# Patient Record
Sex: FEMALE | Race: OTHER | Employment: FULL TIME | URBAN - NONMETROPOLITAN AREA
[De-identification: names, ages, dates, MRNs, and addresses within clinical notes are randomized per-mention and may not be internally consistent; named-entity substitution may affect disease eponyms.]

---

## 2017-02-03 ENCOUNTER — HOSPITAL ENCOUNTER (EMERGENCY)
Dept: EMERGENCY DEPT | Age: 47
Discharge: OTHER INSTITUTION | End: 2017-02-04
Attending: EMERGENCY MEDICINE
Payer: MEDICAID

## 2017-02-03 VITALS
TEMPERATURE: 98.9 F | RESPIRATION RATE: 18 BRPM | DIASTOLIC BLOOD PRESSURE: 84 MMHG | OXYGEN SATURATION: 98 % | SYSTOLIC BLOOD PRESSURE: 134 MMHG | HEART RATE: 75 BPM

## 2017-02-03 DIAGNOSIS — R55 SYNCOPE AND COLLAPSE: ICD-10-CM

## 2017-02-03 DIAGNOSIS — M54.5 LOW BACK PAIN, UNSPECIFIED BACK PAIN LATERALITY, UNSPECIFIED CHRONICITY, WITH SCIATICA PRESENCE UNSPECIFIED: ICD-10-CM

## 2017-02-03 DIAGNOSIS — R07.9 CHEST PAIN, UNSPECIFIED TYPE: Primary | ICD-10-CM

## 2017-02-03 DIAGNOSIS — M54.2 NECK PAIN: ICD-10-CM

## 2017-02-03 LAB
-: ABNORMAL
ABSOLUTE EOS #: 0.1 K/UL (ref 0–0.4)
ABSOLUTE LYMPH #: 2.2 K/UL (ref 1–4.8)
ABSOLUTE MONO #: 0.3 K/UL (ref 0.1–1.2)
AMORPHOUS: ABNORMAL
ANION GAP SERPL CALCULATED.3IONS-SCNC: 15 MMOL/L (ref 9–17)
BACTERIA: ABNORMAL
BASOPHILS # BLD: 1 % (ref 0–2)
BASOPHILS ABSOLUTE: 0.1 K/UL (ref 0–0.2)
BILIRUBIN URINE: NEGATIVE
BUN BLDV-MCNC: 11 MG/DL (ref 6–20)
BUN/CREAT BLD: 17 (ref 9–20)
CALCIUM SERPL-MCNC: 8.8 MG/DL (ref 8.6–10.4)
CASTS UA: ABNORMAL /LPF (ref 0–2)
CHLORIDE BLD-SCNC: 102 MMOL/L (ref 98–107)
CO2: 23 MMOL/L (ref 20–31)
COLOR: ABNORMAL
COMMENT UA: ABNORMAL
CREAT SERPL-MCNC: 0.64 MG/DL (ref 0.5–0.9)
CRYSTALS, UA: ABNORMAL /HPF
D DIMER: 202 NG/ML
DIFFERENTIAL TYPE: ABNORMAL
EOSINOPHILS RELATIVE PERCENT: 1 % (ref 1–4)
EPITHELIAL CELLS UA: ABNORMAL /HPF (ref 0–5)
GFR AFRICAN AMERICAN: >60 ML/MIN
GFR NON-AFRICAN AMERICAN: >60 ML/MIN
GFR SERPL CREATININE-BSD FRML MDRD: NORMAL ML/MIN/{1.73_M2}
GFR SERPL CREATININE-BSD FRML MDRD: NORMAL ML/MIN/{1.73_M2}
GLUCOSE BLD-MCNC: 90 MG/DL (ref 70–99)
GLUCOSE URINE: NEGATIVE
HCG(URINE) PREGNANCY TEST: NEGATIVE
HCT VFR BLD CALC: 47.5 % (ref 36–46)
HEMOGLOBIN: 15.5 G/DL (ref 12–16)
KETONES, URINE: ABNORMAL
LEUKOCYTE ESTERASE, URINE: NEGATIVE
LYMPHOCYTES # BLD: 26 % (ref 24–44)
MCH RBC QN AUTO: 27.5 PG (ref 26–34)
MCHC RBC AUTO-ENTMCNC: 32.7 G/DL (ref 31–37)
MCV RBC AUTO: 84.2 FL (ref 80–100)
MONOCYTES # BLD: 4 % (ref 1–7)
MUCUS: ABNORMAL
NITRITE, URINE: NEGATIVE
OTHER OBSERVATIONS UA: ABNORMAL
PDW BLD-RTO: 14.2 % (ref 11–14.5)
PH UA: 6 (ref 5–6)
PLATELET # BLD: 360 K/UL (ref 140–450)
PLATELET ESTIMATE: ABNORMAL
PMV BLD AUTO: 8.2 FL (ref 6–12)
POTASSIUM SERPL-SCNC: 4 MMOL/L (ref 3.7–5.3)
PROTEIN UA: NEGATIVE
RBC # BLD: 5.64 M/UL (ref 4–5.2)
RBC # BLD: ABNORMAL 10*6/UL
RBC UA: ABNORMAL /HPF (ref 0–4)
RENAL EPITHELIAL, UA: ABNORMAL /HPF
SEG NEUTROPHILS: 68 % (ref 36–66)
SEGMENTED NEUTROPHILS ABSOLUTE COUNT: 5.7 K/UL (ref 1.8–7.7)
SODIUM BLD-SCNC: 140 MMOL/L (ref 135–144)
SPECIFIC GRAVITY UA: 1.02 (ref 1.01–1.02)
TRICHOMONAS: ABNORMAL
TROPONIN INTERP: NORMAL
TROPONIN T: <0.03 NG/ML
TURBIDITY: ABNORMAL
URINE HGB: NEGATIVE
UROBILINOGEN, URINE: NORMAL
WBC # BLD: 8.4 K/UL (ref 3.5–11)
WBC # BLD: ABNORMAL 10*3/UL
WBC UA: ABNORMAL /HPF (ref 0–4)
YEAST: ABNORMAL

## 2017-02-03 PROCEDURE — 85025 COMPLETE CBC W/AUTO DIFF WBC: CPT

## 2017-02-03 PROCEDURE — 72120 X-RAY BEND ONLY L-S SPINE: CPT

## 2017-02-03 PROCEDURE — 72125 CT NECK SPINE W/O DYE: CPT

## 2017-02-03 PROCEDURE — 70450 CT HEAD/BRAIN W/O DYE: CPT

## 2017-02-03 PROCEDURE — 84703 CHORIONIC GONADOTROPIN ASSAY: CPT

## 2017-02-03 PROCEDURE — 81001 URINALYSIS AUTO W/SCOPE: CPT

## 2017-02-03 PROCEDURE — 36415 COLL VENOUS BLD VENIPUNCTURE: CPT

## 2017-02-03 PROCEDURE — 93005 ELECTROCARDIOGRAM TRACING: CPT

## 2017-02-03 PROCEDURE — 72100 X-RAY EXAM L-S SPINE 2/3 VWS: CPT

## 2017-02-03 PROCEDURE — 84484 ASSAY OF TROPONIN QUANT: CPT

## 2017-02-03 PROCEDURE — 9990 CHARGE CONVERSION

## 2017-02-03 PROCEDURE — 99285 EMERGENCY DEPT VISIT HI MDM: CPT

## 2017-02-03 PROCEDURE — 71020 CHARGE CONVERSION: CPT

## 2017-02-03 PROCEDURE — 99282 EMERGENCY DEPT VISIT SF MDM: CPT

## 2017-02-03 PROCEDURE — 85379 FIBRIN DEGRADATION QUANT: CPT

## 2017-02-03 PROCEDURE — 80048 BASIC METABOLIC PNL TOTAL CA: CPT

## 2017-02-03 RX ORDER — 0.9 % SODIUM CHLORIDE 0.9 %
1000 INTRAVENOUS SOLUTION INTRAVENOUS ONCE
Status: COMPLETED | OUTPATIENT
Start: 2017-02-03 | End: 2017-02-03

## 2017-02-03 RX ORDER — NITROGLYCERIN 0.4 MG/1
0.4 TABLET SUBLINGUAL EVERY 5 MIN PRN
Status: DISCONTINUED | OUTPATIENT
Start: 2017-02-03 | End: 2017-02-04 | Stop reason: HOSPADM

## 2017-02-03 RX ORDER — AMLODIPINE BESYLATE 10 MG/1
10 TABLET ORAL DAILY
Status: ON HOLD | COMMUNITY
End: 2017-02-05 | Stop reason: HOSPADM

## 2017-02-03 RX ORDER — ACETAMINOPHEN 500 MG
1000 TABLET ORAL ONCE
Status: COMPLETED | OUTPATIENT
Start: 2017-02-03 | End: 2017-02-03

## 2017-02-03 RX ADMIN — Medication 1000 ML: at 21:52

## 2017-02-03 RX ADMIN — NITROGLYCERIN 0.4 MG: 0.4 TABLET SUBLINGUAL at 20:47

## 2017-02-03 RX ADMIN — Medication 1000 MG: at 21:51

## 2017-02-03 ASSESSMENT — ENCOUNTER SYMPTOMS
NAUSEA: 0
VOMITING: 0
SHORTNESS OF BREATH: 0

## 2017-02-03 ASSESSMENT — PAIN DESCRIPTION - DESCRIPTORS: DESCRIPTORS: SHARP

## 2017-02-03 ASSESSMENT — PAIN SCALES - GENERAL
PAINLEVEL_OUTOF10: 8
PAINLEVEL_OUTOF10: 2
PAINLEVEL_OUTOF10: 9

## 2017-02-03 ASSESSMENT — PAIN DESCRIPTION - FREQUENCY: FREQUENCY: CONTINUOUS

## 2017-02-03 ASSESSMENT — PAIN DESCRIPTION - LOCATION: LOCATION: CHEST

## 2017-02-03 ASSESSMENT — PAIN DESCRIPTION - PAIN TYPE: TYPE: ACUTE PAIN

## 2017-02-04 ENCOUNTER — HOSPITAL ENCOUNTER (INPATIENT)
Age: 47
LOS: 1 days | Discharge: HOME | DRG: 204 | End: 2017-02-05
Attending: FAMILY MEDICINE | Admitting: FAMILY MEDICINE
Payer: MEDICAID

## 2017-02-04 PROBLEM — I10 ESSENTIAL HYPERTENSION: Status: ACTIVE | Noted: 2017-02-04

## 2017-02-04 PROBLEM — R00.2 PALPITATION: Status: ACTIVE | Noted: 2017-02-04

## 2017-02-04 PROBLEM — R07.89 ANTERIOR CHEST WALL PAIN: Status: ACTIVE | Noted: 2017-02-04

## 2017-02-04 PROBLEM — R55 SYNCOPE AND COLLAPSE: Status: ACTIVE | Noted: 2017-02-04

## 2017-02-04 LAB
ANION GAP SERPL CALCULATED.3IONS-SCNC: 16 MMOL/L (ref 9–17)
BUN BLDV-MCNC: 8 MG/DL (ref 6–20)
BUN/CREAT BLD: ABNORMAL (ref 9–20)
CALCIUM SERPL-MCNC: 8.2 MG/DL (ref 8.6–10.4)
CHLORIDE BLD-SCNC: 106 MMOL/L (ref 98–107)
CHOLESTEROL/HDL RATIO: 4.2
CHOLESTEROL: 146 MG/DL
CO2: 16 MMOL/L (ref 20–31)
CREAT SERPL-MCNC: 0.49 MG/DL (ref 0.5–0.9)
GFR AFRICAN AMERICAN: >60 ML/MIN
GFR NON-AFRICAN AMERICAN: >60 ML/MIN
GFR SERPL CREATININE-BSD FRML MDRD: ABNORMAL ML/MIN/{1.73_M2}
GFR SERPL CREATININE-BSD FRML MDRD: ABNORMAL ML/MIN/{1.73_M2}
GLUCOSE BLD-MCNC: 89 MG/DL (ref 70–99)
HCT VFR BLD CALC: 41.9 % (ref 36–46)
HDLC SERPL-MCNC: 35 MG/DL
HEMOGLOBIN: 14.1 G/DL (ref 12–16)
LDL CHOLESTEROL: 94 MG/DL (ref 0–130)
LV EF: 55 %
LVEF MODALITY: NORMAL
MCH RBC QN AUTO: 27.7 PG (ref 26–34)
MCHC RBC AUTO-ENTMCNC: 33.6 G/DL (ref 31–37)
MCV RBC AUTO: 82.7 FL (ref 80–100)
MYOGLOBIN: 23 NG/ML (ref 25–58)
MYOGLOBIN: <21 NG/ML (ref 25–58)
PDW BLD-RTO: 14.5 % (ref 12.5–15.4)
PLATELET # BLD: 330 K/UL (ref 140–450)
PMV BLD AUTO: 8.8 FL (ref 6–12)
POTASSIUM SERPL-SCNC: 3.8 MMOL/L (ref 3.7–5.3)
RBC # BLD: 5.07 M/UL (ref 4–5.2)
SODIUM BLD-SCNC: 138 MMOL/L (ref 135–144)
TRIGL SERPL-MCNC: 83 MG/DL
TROPONIN INTERP: ABNORMAL
TROPONIN INTERP: ABNORMAL
TROPONIN INTERP: NORMAL
TROPONIN T: <0.03 NG/ML
TSH SERPL DL<=0.05 MIU/L-ACNC: 2.41 MIU/L (ref 0.3–5)
VLDLC SERPL CALC-MCNC: ABNORMAL MG/DL (ref 1–30)
WBC # BLD: 7.4 K/UL (ref 3.5–11)

## 2017-02-04 PROCEDURE — 99223 1ST HOSP IP/OBS HIGH 75: CPT | Performed by: FAMILY MEDICINE

## 2017-02-04 PROCEDURE — 80048 BASIC METABOLIC PNL TOTAL CA: CPT

## 2017-02-04 PROCEDURE — 83874 ASSAY OF MYOGLOBIN: CPT

## 2017-02-04 PROCEDURE — 84484 ASSAY OF TROPONIN QUANT: CPT

## 2017-02-04 PROCEDURE — 85027 COMPLETE CBC AUTOMATED: CPT

## 2017-02-04 PROCEDURE — 93005 ELECTROCARDIOGRAM TRACING: CPT

## 2017-02-04 PROCEDURE — 93306 TTE W/DOPPLER COMPLETE: CPT | Performed by: FAMILY MEDICINE

## 2017-02-04 PROCEDURE — 84443 ASSAY THYROID STIM HORMONE: CPT

## 2017-02-04 PROCEDURE — 36415 COLL VENOUS BLD VENIPUNCTURE: CPT

## 2017-02-04 PROCEDURE — 80061 LIPID PANEL: CPT

## 2017-02-04 PROCEDURE — 2140000000 HC CCU INTERMEDIATE R&B

## 2017-02-04 PROCEDURE — 6370000000 HC RX 637 (ALT 250 FOR IP): Performed by: FAMILY MEDICINE

## 2017-02-04 RX ORDER — MORPHINE SULFATE 2 MG/ML
1 INJECTION, SOLUTION INTRAMUSCULAR; INTRAVENOUS
Status: DISCONTINUED | OUTPATIENT
Start: 2017-02-04 | End: 2017-02-05 | Stop reason: HOSPADM

## 2017-02-04 RX ORDER — SODIUM CHLORIDE 9 MG/ML
INJECTION, SOLUTION INTRAVENOUS CONTINUOUS
Status: DISCONTINUED | OUTPATIENT
Start: 2017-02-04 | End: 2017-02-05

## 2017-02-04 RX ORDER — ACETAMINOPHEN 325 MG/1
650 TABLET ORAL EVERY 4 HOURS PRN
Status: DISCONTINUED | OUTPATIENT
Start: 2017-02-04 | End: 2017-02-05 | Stop reason: HOSPADM

## 2017-02-04 RX ORDER — POTASSIUM CHLORIDE 20 MEQ/1
40 TABLET, EXTENDED RELEASE ORAL PRN
Status: DISCONTINUED | OUTPATIENT
Start: 2017-02-04 | End: 2017-02-05 | Stop reason: HOSPADM

## 2017-02-04 RX ORDER — ASPIRIN 81 MG/1
81 TABLET ORAL DAILY
Status: DISCONTINUED | OUTPATIENT
Start: 2017-02-04 | End: 2017-02-05 | Stop reason: HOSPADM

## 2017-02-04 RX ORDER — DOCUSATE SODIUM 100 MG/1
100 CAPSULE, LIQUID FILLED ORAL 2 TIMES DAILY PRN
Status: DISCONTINUED | OUTPATIENT
Start: 2017-02-04 | End: 2017-02-05 | Stop reason: HOSPADM

## 2017-02-04 RX ORDER — SODIUM CHLORIDE 0.9 % (FLUSH) 0.9 %
10 SYRINGE (ML) INJECTION EVERY 12 HOURS SCHEDULED
Status: DISCONTINUED | OUTPATIENT
Start: 2017-02-04 | End: 2017-02-05 | Stop reason: HOSPADM

## 2017-02-04 RX ORDER — ONDANSETRON 2 MG/ML
4 INJECTION INTRAMUSCULAR; INTRAVENOUS EVERY 6 HOURS PRN
Status: DISCONTINUED | OUTPATIENT
Start: 2017-02-04 | End: 2017-02-05 | Stop reason: HOSPADM

## 2017-02-04 RX ORDER — POTASSIUM CHLORIDE 7.45 MG/ML
10 INJECTION INTRAVENOUS PRN
Status: DISCONTINUED | OUTPATIENT
Start: 2017-02-04 | End: 2017-02-05 | Stop reason: HOSPADM

## 2017-02-04 RX ORDER — POTASSIUM CHLORIDE 20MEQ/15ML
40 LIQUID (ML) ORAL PRN
Status: DISCONTINUED | OUTPATIENT
Start: 2017-02-04 | End: 2017-02-05 | Stop reason: HOSPADM

## 2017-02-04 RX ORDER — PANTOPRAZOLE SODIUM 40 MG/1
40 TABLET, DELAYED RELEASE ORAL
Status: DISCONTINUED | OUTPATIENT
Start: 2017-02-04 | End: 2017-02-05 | Stop reason: HOSPADM

## 2017-02-04 RX ORDER — SODIUM CHLORIDE 0.9 % (FLUSH) 0.9 %
10 SYRINGE (ML) INJECTION PRN
Status: DISCONTINUED | OUTPATIENT
Start: 2017-02-04 | End: 2017-02-05 | Stop reason: HOSPADM

## 2017-02-04 RX ORDER — HYDROCODONE BITARTRATE AND ACETAMINOPHEN 5; 325 MG/1; MG/1
1 TABLET ORAL EVERY 4 HOURS PRN
Status: DISCONTINUED | OUTPATIENT
Start: 2017-02-04 | End: 2017-02-05 | Stop reason: HOSPADM

## 2017-02-04 RX ADMIN — ASPIRIN 81 MG: 81 TABLET, COATED ORAL at 20:29

## 2017-02-04 ASSESSMENT — PAIN DESCRIPTION - DESCRIPTORS: DESCRIPTORS: SHARP

## 2017-02-04 ASSESSMENT — PAIN DESCRIPTION - LOCATION: LOCATION: CHEST

## 2017-02-04 ASSESSMENT — ENCOUNTER SYMPTOMS
CONSTIPATION: 0
SHORTNESS OF BREATH: 0
VOMITING: 0
NAUSEA: 0
BACK PAIN: 0
BLOOD IN STOOL: 0
CHEST TIGHTNESS: 0
RHINORRHEA: 0
EYE PAIN: 0
COUGH: 0
ABDOMINAL PAIN: 0
DIARRHEA: 0

## 2017-02-04 ASSESSMENT — PAIN SCALES - GENERAL
PAINLEVEL_OUTOF10: 0
PAINLEVEL_OUTOF10: 0
PAINLEVEL_OUTOF10: 2

## 2017-02-04 ASSESSMENT — PAIN DESCRIPTION - ORIENTATION: ORIENTATION: LEFT;MID

## 2017-02-04 ASSESSMENT — PAIN DESCRIPTION - FREQUENCY: FREQUENCY: INTERMITTENT

## 2017-02-04 ASSESSMENT — PAIN DESCRIPTION - PAIN TYPE: TYPE: ACUTE PAIN

## 2017-02-05 VITALS
SYSTOLIC BLOOD PRESSURE: 100 MMHG | TEMPERATURE: 97.7 F | DIASTOLIC BLOOD PRESSURE: 68 MMHG | HEART RATE: 71 BPM | OXYGEN SATURATION: 97 % | WEIGHT: 146.4 LBS | RESPIRATION RATE: 19 BRPM | HEIGHT: 58 IN | BODY MASS INDEX: 30.73 KG/M2

## 2017-02-05 PROBLEM — K21.9 GERD (GASTROESOPHAGEAL REFLUX DISEASE): Status: ACTIVE | Noted: 2017-02-05

## 2017-02-05 LAB
ANION GAP SERPL CALCULATED.3IONS-SCNC: 13 MMOL/L (ref 9–17)
BUN BLDV-MCNC: 13 MG/DL (ref 6–20)
BUN/CREAT BLD: ABNORMAL (ref 9–20)
CALCIUM SERPL-MCNC: 8.7 MG/DL (ref 8.6–10.4)
CHLORIDE BLD-SCNC: 106 MMOL/L (ref 98–107)
CO2: 19 MMOL/L (ref 20–31)
CREAT SERPL-MCNC: 0.62 MG/DL (ref 0.5–0.9)
EKG ATRIAL RATE: 89 BPM
EKG P AXIS: 39 DEGREES
EKG P-R INTERVAL: 160 MS
EKG Q-T INTERVAL: 348 MS
EKG QRS DURATION: 72 MS
EKG QTC CALCULATION (BAZETT): 423 MS
EKG R AXIS: 13 DEGREES
EKG T AXIS: 23 DEGREES
EKG VENTRICULAR RATE: 89 BPM
GFR AFRICAN AMERICAN: >60 ML/MIN
GFR NON-AFRICAN AMERICAN: >60 ML/MIN
GFR SERPL CREATININE-BSD FRML MDRD: ABNORMAL ML/MIN/{1.73_M2}
GFR SERPL CREATININE-BSD FRML MDRD: ABNORMAL ML/MIN/{1.73_M2}
GLUCOSE BLD-MCNC: 97 MG/DL (ref 70–99)
HCT VFR BLD CALC: 43.2 % (ref 36–46)
HEMOGLOBIN: 14.8 G/DL (ref 12–16)
MCH RBC QN AUTO: 27.6 PG (ref 26–34)
MCHC RBC AUTO-ENTMCNC: 34.2 G/DL (ref 31–37)
MCV RBC AUTO: 80.9 FL (ref 80–100)
PDW BLD-RTO: 14.3 % (ref 12.5–15.4)
PLATELET # BLD: 320 K/UL (ref 140–450)
PMV BLD AUTO: 8.6 FL (ref 6–12)
POTASSIUM SERPL-SCNC: 3.7 MMOL/L (ref 3.7–5.3)
RBC # BLD: 5.34 M/UL (ref 4–5.2)
SODIUM BLD-SCNC: 138 MMOL/L (ref 135–144)
WBC # BLD: 7.6 K/UL (ref 3.5–11)

## 2017-02-05 PROCEDURE — 85027 COMPLETE CBC AUTOMATED: CPT

## 2017-02-05 PROCEDURE — 99239 HOSP IP/OBS DSCHRG MGMT >30: CPT | Performed by: FAMILY MEDICINE

## 2017-02-05 PROCEDURE — 36415 COLL VENOUS BLD VENIPUNCTURE: CPT

## 2017-02-05 PROCEDURE — 6370000000 HC RX 637 (ALT 250 FOR IP): Performed by: FAMILY MEDICINE

## 2017-02-05 PROCEDURE — 80048 BASIC METABOLIC PNL TOTAL CA: CPT

## 2017-02-05 RX ORDER — ASPIRIN 81 MG/1
81 TABLET ORAL DAILY
Qty: 30 TABLET | Refills: 3 | Status: SHIPPED | OUTPATIENT
Start: 2017-02-05

## 2017-02-05 RX ORDER — PANTOPRAZOLE SODIUM 40 MG/1
40 TABLET, DELAYED RELEASE ORAL
Qty: 30 TABLET | Refills: 3 | Status: SHIPPED | OUTPATIENT
Start: 2017-02-05 | End: 2017-06-15

## 2017-02-05 RX ADMIN — PANTOPRAZOLE SODIUM 40 MG: 40 TABLET, DELAYED RELEASE ORAL at 09:37

## 2017-02-05 RX ADMIN — ASPIRIN 81 MG: 81 TABLET, COATED ORAL at 09:38

## 2017-02-05 ASSESSMENT — ENCOUNTER SYMPTOMS
COUGH: 0
RHINORRHEA: 0
ABDOMINAL PAIN: 0
CONSTIPATION: 0
DIARRHEA: 0
SHORTNESS OF BREATH: 0
BACK PAIN: 0
CHEST TIGHTNESS: 0
VOMITING: 0
EYE PAIN: 0
BLOOD IN STOOL: 0
NAUSEA: 0

## 2017-02-05 ASSESSMENT — PAIN SCALES - GENERAL: PAINLEVEL_OUTOF10: 0

## 2017-02-07 LAB
EKG ATRIAL RATE: 52 BPM
EKG P AXIS: 46 DEGREES
EKG P-R INTERVAL: 176 MS
EKG Q-T INTERVAL: 458 MS
EKG QRS DURATION: 78 MS
EKG QTC CALCULATION (BAZETT): 425 MS
EKG R AXIS: 34 DEGREES
EKG T AXIS: 35 DEGREES
EKG VENTRICULAR RATE: 52 BPM

## 2017-02-13 ENCOUNTER — PROCEDURE VISIT (OUTPATIENT)
Dept: CARDIOLOGY | Age: 47
End: 2017-02-13

## 2017-02-13 DIAGNOSIS — R55 SYNCOPE, UNSPECIFIED SYNCOPE TYPE: Primary | ICD-10-CM

## 2017-02-15 ENCOUNTER — PROCEDURE VISIT (OUTPATIENT)
Dept: CARDIOLOGY | Age: 47
End: 2017-02-15

## 2017-02-15 DIAGNOSIS — R55 SYNCOPE, UNSPECIFIED SYNCOPE TYPE: Primary | ICD-10-CM

## 2017-02-16 PROCEDURE — 93224 XTRNL ECG REC UP TO 48 HRS: CPT | Performed by: INTERNAL MEDICINE

## 2017-03-06 ENCOUNTER — HOSPITAL ENCOUNTER (EMERGENCY)
Age: 47
Discharge: HOME | End: 2017-03-06
Attending: EMERGENCY MEDICINE
Payer: MEDICAID

## 2017-03-06 VITALS
OXYGEN SATURATION: 97 % | TEMPERATURE: 99.5 F | HEART RATE: 88 BPM | WEIGHT: 137 LBS | BODY MASS INDEX: 28.63 KG/M2 | DIASTOLIC BLOOD PRESSURE: 89 MMHG | RESPIRATION RATE: 16 BRPM | SYSTOLIC BLOOD PRESSURE: 143 MMHG

## 2017-03-06 DIAGNOSIS — K52.9 GASTROENTERITIS: Primary | ICD-10-CM

## 2017-03-06 LAB
ABSOLUTE EOS #: 0.1 K/UL (ref 0–0.4)
ABSOLUTE LYMPH #: 1.6 K/UL (ref 1–4.8)
ABSOLUTE MONO #: 0.4 K/UL (ref 0.1–1.2)
ANION GAP SERPL CALCULATED.3IONS-SCNC: 10 MMOL/L (ref 9–17)
BASOPHILS # BLD: 1 % (ref 0–2)
BASOPHILS ABSOLUTE: 0.1 K/UL (ref 0–0.2)
BUN BLDV-MCNC: 6 MG/DL (ref 6–20)
BUN/CREAT BLD: 10 (ref 9–20)
CALCIUM SERPL-MCNC: 8.6 MG/DL (ref 8.6–10.4)
CHLORIDE BLD-SCNC: 107 MMOL/L (ref 98–107)
CO2: 24 MMOL/L (ref 20–31)
CREAT SERPL-MCNC: 0.6 MG/DL (ref 0.5–0.9)
DIFFERENTIAL TYPE: NORMAL
EOSINOPHILS RELATIVE PERCENT: 2 % (ref 1–4)
GFR AFRICAN AMERICAN: >60 ML/MIN
GFR NON-AFRICAN AMERICAN: >60 ML/MIN
GFR SERPL CREATININE-BSD FRML MDRD: ABNORMAL ML/MIN/{1.73_M2}
GFR SERPL CREATININE-BSD FRML MDRD: ABNORMAL ML/MIN/{1.73_M2}
GLUCOSE BLD-MCNC: 106 MG/DL (ref 70–99)
HCG QUALITATIVE: NEGATIVE
HCT VFR BLD CALC: 43.6 % (ref 36–46)
HEMOGLOBIN: 13.9 G/DL (ref 12–16)
LYMPHOCYTES # BLD: 25 % (ref 24–44)
MCH RBC QN AUTO: 26.8 PG (ref 26–34)
MCHC RBC AUTO-ENTMCNC: 32 G/DL (ref 31–37)
MCV RBC AUTO: 83.8 FL (ref 80–100)
MONOCYTES # BLD: 7 % (ref 1–7)
PDW BLD-RTO: 14 % (ref 11–14.5)
PLATELET # BLD: 306 K/UL (ref 140–450)
PLATELET ESTIMATE: NORMAL
PMV BLD AUTO: 9 FL (ref 6–12)
POTASSIUM SERPL-SCNC: 4.2 MMOL/L (ref 3.7–5.3)
RBC # BLD: 5.19 M/UL (ref 4–5.2)
RBC # BLD: NORMAL 10*6/UL
SEG NEUTROPHILS: 65 % (ref 36–66)
SEGMENTED NEUTROPHILS ABSOLUTE COUNT: 4.2 K/UL (ref 1.8–7.7)
SODIUM BLD-SCNC: 141 MMOL/L (ref 135–144)
WBC # BLD: 6.4 K/UL (ref 3.5–11)
WBC # BLD: NORMAL 10*3/UL

## 2017-03-06 PROCEDURE — 96361 HYDRATE IV INFUSION ADD-ON: CPT

## 2017-03-06 PROCEDURE — 99284 EMERGENCY DEPT VISIT MOD MDM: CPT

## 2017-03-06 PROCEDURE — 96374 THER/PROPH/DIAG INJ IV PUSH: CPT

## 2017-03-06 PROCEDURE — 84703 CHORIONIC GONADOTROPIN ASSAY: CPT

## 2017-03-06 PROCEDURE — 36415 COLL VENOUS BLD VENIPUNCTURE: CPT

## 2017-03-06 PROCEDURE — 6360000002 HC RX W HCPCS: Performed by: EMERGENCY MEDICINE

## 2017-03-06 PROCEDURE — 2580000003 HC RX 258: Performed by: EMERGENCY MEDICINE

## 2017-03-06 PROCEDURE — 85025 COMPLETE CBC W/AUTO DIFF WBC: CPT

## 2017-03-06 PROCEDURE — 80048 BASIC METABOLIC PNL TOTAL CA: CPT

## 2017-03-06 RX ORDER — 0.9 % SODIUM CHLORIDE 0.9 %
1000 INTRAVENOUS SOLUTION INTRAVENOUS ONCE
Status: COMPLETED | OUTPATIENT
Start: 2017-03-06 | End: 2017-03-06

## 2017-03-06 RX ORDER — SULFAMETHOXAZOLE AND TRIMETHOPRIM 800; 160 MG/1; MG/1
1 TABLET ORAL ONCE
Status: DISCONTINUED | OUTPATIENT
Start: 2017-03-06 | End: 2017-03-06

## 2017-03-06 RX ORDER — ONDANSETRON 2 MG/ML
4 INJECTION INTRAMUSCULAR; INTRAVENOUS ONCE
Status: COMPLETED | OUTPATIENT
Start: 2017-03-06 | End: 2017-03-06

## 2017-03-06 RX ORDER — ONDANSETRON 4 MG/1
4 TABLET, FILM COATED ORAL EVERY 8 HOURS PRN
Qty: 10 TABLET | Refills: 0 | Status: SHIPPED | OUTPATIENT
Start: 2017-03-06 | End: 2017-04-20 | Stop reason: ALTCHOICE

## 2017-03-06 RX ADMIN — SODIUM CHLORIDE 1000 ML: 9 INJECTION, SOLUTION INTRAVENOUS at 13:14

## 2017-03-06 RX ADMIN — ONDANSETRON 4 MG: 2 INJECTION INTRAMUSCULAR; INTRAVENOUS at 13:14

## 2017-03-06 ASSESSMENT — PAIN DESCRIPTION - FREQUENCY: FREQUENCY: CONTINUOUS

## 2017-03-06 ASSESSMENT — PAIN DESCRIPTION - PAIN TYPE: TYPE: ACUTE PAIN

## 2017-03-06 ASSESSMENT — PAIN DESCRIPTION - DESCRIPTORS: DESCRIPTORS: ACHING

## 2017-03-06 ASSESSMENT — PAIN SCALES - GENERAL: PAINLEVEL_OUTOF10: 8

## 2017-03-23 ENCOUNTER — HOSPITAL ENCOUNTER (EMERGENCY)
Age: 47
Discharge: HOME | End: 2017-03-23
Attending: SPECIALIST
Payer: MEDICAID

## 2017-03-23 VITALS
OXYGEN SATURATION: 96 % | HEART RATE: 90 BPM | DIASTOLIC BLOOD PRESSURE: 84 MMHG | SYSTOLIC BLOOD PRESSURE: 150 MMHG | RESPIRATION RATE: 14 BRPM | TEMPERATURE: 99.3 F
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
## 2017-03-23 DIAGNOSIS — S10.96XA INSECT BITE OF NECK WITH LOCAL REACTION, INITIAL ENCOUNTER: Primary | ICD-10-CM

## 2017-03-23 PROCEDURE — 99282 EMERGENCY DEPT VISIT SF MDM: CPT

## 2017-03-23 PROCEDURE — 6370000000 HC RX 637 (ALT 250 FOR IP): Performed by: SPECIALIST

## 2017-03-23 RX ORDER — PREDNISONE 20 MG/1
60 TABLET ORAL ONCE
Status: COMPLETED | OUTPATIENT
Start: 2017-03-23 | End: 2017-03-23

## 2017-03-23 RX ORDER — FAMOTIDINE 20 MG/1
20 TABLET, FILM COATED ORAL ONCE
Status: COMPLETED | OUTPATIENT
Start: 2017-03-23 | End: 2017-03-23

## 2017-03-23 RX ORDER — PREDNISONE 50 MG/1
50 TABLET ORAL DAILY
Qty: 4 TABLET | Refills: 0 | Status: SHIPPED | OUTPATIENT
Start: 2017-03-23 | End: 2017-04-20 | Stop reason: ALTCHOICE

## 2017-03-23 RX ORDER — FAMOTIDINE 20 MG/1
20 TABLET, FILM COATED ORAL 2 TIMES DAILY
Qty: 10 TABLET | Refills: 0 | Status: SHIPPED | OUTPATIENT
Start: 2017-03-23 | End: 2017-04-20 | Stop reason: SDUPTHER

## 2017-03-23 RX ADMIN — PREDNISONE 60 MG: 20 TABLET ORAL at 14:59

## 2017-03-23 RX ADMIN — FAMOTIDINE 20 MG: 20 TABLET ORAL at 14:59

## 2017-03-23 ASSESSMENT — ENCOUNTER SYMPTOMS
WHEEZING: 0
COLOR CHANGE: 1
SHORTNESS OF BREATH: 0

## 2017-04-20 ENCOUNTER — OFFICE VISIT (OUTPATIENT)
Dept: FAMILY MEDICINE CLINIC | Age: 47
End: 2017-04-20
Payer: MEDICAID

## 2017-04-20 VITALS
BODY MASS INDEX: 31.89 KG/M2 | WEIGHT: 147.8 LBS | HEIGHT: 57 IN | RESPIRATION RATE: 20 BRPM | OXYGEN SATURATION: 98 % | HEART RATE: 104 BPM | SYSTOLIC BLOOD PRESSURE: 136 MMHG | DIASTOLIC BLOOD PRESSURE: 92 MMHG | TEMPERATURE: 98.8 F

## 2017-04-20 DIAGNOSIS — Z76.89 ENCOUNTER TO ESTABLISH CARE: Primary | ICD-10-CM

## 2017-04-20 DIAGNOSIS — J06.9 UPPER RESPIRATORY TRACT INFECTION, UNSPECIFIED TYPE: ICD-10-CM

## 2017-04-20 DIAGNOSIS — Z12.4 CERVICAL CANCER SCREENING: ICD-10-CM

## 2017-04-20 DIAGNOSIS — Z91.09 ENVIRONMENTAL ALLERGIES: ICD-10-CM

## 2017-04-20 DIAGNOSIS — R06.2 WHEEZING: ICD-10-CM

## 2017-04-20 PROCEDURE — 99203 OFFICE O/P NEW LOW 30 MIN: CPT | Performed by: NURSE PRACTITIONER

## 2017-04-20 PROCEDURE — 99386 PREV VISIT NEW AGE 40-64: CPT | Performed by: NURSE PRACTITIONER

## 2017-04-20 RX ORDER — DOXYCYCLINE HYCLATE 100 MG/1
100 CAPSULE ORAL DAILY
COMMUNITY
Start: 2017-04-17 | End: 2017-06-15 | Stop reason: ALTCHOICE

## 2017-04-20 RX ORDER — ERYTHROMYCIN 5 MG/G
OINTMENT OPHTHALMIC
COMMUNITY
Start: 2017-04-15 | End: 2017-04-22

## 2017-04-20 RX ORDER — METHYLPREDNISOLONE 4 MG/1
TABLET ORAL
Qty: 1 KIT | Refills: 0 | Status: SHIPPED | OUTPATIENT
Start: 2017-04-20 | End: 2017-04-26

## 2017-04-20 RX ORDER — ALBUTEROL SULFATE 90 UG/1
2 AEROSOL, METERED RESPIRATORY (INHALATION) EVERY 6 HOURS PRN
Qty: 1 INHALER | Refills: 3 | Status: SHIPPED | OUTPATIENT
Start: 2017-04-20 | End: 2022-06-10 | Stop reason: SDUPTHER

## 2017-04-20 RX ORDER — DIPHENHYDRAMINE HCL 25 MG
25 TABLET ORAL
COMMUNITY
End: 2017-06-15 | Stop reason: ALTCHOICE

## 2017-04-20 RX ORDER — FEXOFENADINE HCL 180 MG/1
180 TABLET ORAL DAILY
Qty: 30 TABLET | Refills: 5 | Status: SHIPPED | OUTPATIENT
Start: 2017-04-20 | End: 2017-05-20

## 2017-04-20 ASSESSMENT — ENCOUNTER SYMPTOMS
NAUSEA: 0
VOMITING: 0
RHINORRHEA: 1
COUGH: 1
SHORTNESS OF BREATH: 0
TROUBLE SWALLOWING: 0
DIARRHEA: 0
SINUS PRESSURE: 0
CONSTIPATION: 0
GASTROINTESTINAL NEGATIVE: 1
EYES NEGATIVE: 1
ALLERGIC/IMMUNOLOGIC NEGATIVE: 1
CHEST TIGHTNESS: 0
ABDOMINAL PAIN: 0

## 2017-04-26 ENCOUNTER — OFFICE VISIT (OUTPATIENT)
Dept: FAMILY MEDICINE CLINIC | Age: 47
End: 2017-04-26
Payer: MEDICAID

## 2017-04-26 VITALS
SYSTOLIC BLOOD PRESSURE: 130 MMHG | WEIGHT: 145.4 LBS | TEMPERATURE: 98.3 F | HEART RATE: 74 BPM | OXYGEN SATURATION: 98 % | BODY MASS INDEX: 31.37 KG/M2 | HEIGHT: 57 IN | DIASTOLIC BLOOD PRESSURE: 90 MMHG | RESPIRATION RATE: 20 BRPM

## 2017-04-26 DIAGNOSIS — K59.00 CONSTIPATION, UNSPECIFIED CONSTIPATION TYPE: Primary | ICD-10-CM

## 2017-04-26 DIAGNOSIS — R11.11 VOMITING WITHOUT NAUSEA, INTRACTABILITY OF VOMITING NOT SPECIFIED, UNSPECIFIED VOMITING TYPE: ICD-10-CM

## 2017-04-26 PROCEDURE — 99213 OFFICE O/P EST LOW 20 MIN: CPT | Performed by: NURSE PRACTITIONER

## 2017-04-26 RX ORDER — POLYETHYLENE GLYCOL 3350 17 G/17G
17 POWDER, FOR SOLUTION ORAL DAILY PRN
Qty: 510 G | Refills: 0 | Status: SHIPPED | OUTPATIENT
Start: 2017-04-26 | End: 2017-05-26

## 2017-04-26 ASSESSMENT — ENCOUNTER SYMPTOMS
COUGH: 0
CHEST TIGHTNESS: 0
EYES NEGATIVE: 1
ALLERGIC/IMMUNOLOGIC NEGATIVE: 1
RESPIRATORY NEGATIVE: 1
SHORTNESS OF BREATH: 0
ABDOMINAL PAIN: 0
DIARRHEA: 0
TROUBLE SWALLOWING: 0
NAUSEA: 0
CONSTIPATION: 1
VOMITING: 1
SINUS PRESSURE: 0

## 2017-05-02 ENCOUNTER — HOSPITAL ENCOUNTER (OUTPATIENT)
Age: 47
Setting detail: SPECIMEN
Discharge: HOME | End: 2017-05-02
Payer: MEDICARE

## 2017-05-02 LAB — T4 TOTAL: 7.4 UG/DL (ref 4.5–12)

## 2017-05-03 DIAGNOSIS — Z11.4 ENCOUNTER FOR SCREENING FOR HIV: Primary | ICD-10-CM

## 2017-05-04 ENCOUNTER — HOSPITAL ENCOUNTER (OUTPATIENT)
Age: 47
Setting detail: SPECIMEN
Discharge: HOME | End: 2017-05-04
Payer: MEDICARE

## 2017-05-04 DIAGNOSIS — Z3A.01 LESS THAN 8 WEEKS GESTATION OF PREGNANCY: Primary | ICD-10-CM

## 2017-05-04 LAB
HCG(URINE) PREGNANCY TEST: NEGATIVE
HIV AG/AB: NONREACTIVE
VITAMIN D 1,25-DIHYDROXY: 56.8 PG/ML (ref 19.9–79.3)

## 2017-05-04 PROCEDURE — 84703 CHORIONIC GONADOTROPIN ASSAY: CPT | Performed by: NURSE PRACTITIONER

## 2017-05-15 ENCOUNTER — HOSPITAL ENCOUNTER (EMERGENCY)
Age: 47
Discharge: HOME | End: 2017-05-15
Attending: EMERGENCY MEDICINE
Payer: MEDICARE

## 2017-05-15 VITALS
WEIGHT: 140 LBS | SYSTOLIC BLOOD PRESSURE: 160 MMHG | DIASTOLIC BLOOD PRESSURE: 99 MMHG | RESPIRATION RATE: 16 BRPM | HEIGHT: 58 IN | HEART RATE: 96 BPM | OXYGEN SATURATION: 99 % | BODY MASS INDEX: 29.39 KG/M2 | TEMPERATURE: 97.8 F

## 2017-05-15 DIAGNOSIS — K08.89 PAIN, DENTAL: Primary | ICD-10-CM

## 2017-05-15 PROCEDURE — 99282 EMERGENCY DEPT VISIT SF MDM: CPT

## 2017-05-15 RX ORDER — AMOXICILLIN 500 MG/1
500 CAPSULE ORAL 3 TIMES DAILY
Qty: 30 CAPSULE | Refills: 0 | Status: SHIPPED | OUTPATIENT
Start: 2017-05-15 | End: 2017-05-25

## 2017-05-15 ASSESSMENT — PAIN SCALES - GENERAL: PAINLEVEL_OUTOF10: 9

## 2017-05-15 ASSESSMENT — PAIN DESCRIPTION - PAIN TYPE: TYPE: ACUTE PAIN

## 2017-05-15 ASSESSMENT — PAIN DESCRIPTION - LOCATION: LOCATION: MOUTH

## 2017-05-15 ASSESSMENT — PAIN DESCRIPTION - DESCRIPTORS: DESCRIPTORS: ACHING

## 2017-05-17 ENCOUNTER — PATIENT MESSAGE (OUTPATIENT)
Dept: FAMILY MEDICINE CLINIC | Age: 47
End: 2017-05-17

## 2017-06-13 ENCOUNTER — HOSPITAL ENCOUNTER (EMERGENCY)
Age: 47
Discharge: HOME | End: 2017-06-14
Attending: EMERGENCY MEDICINE
Payer: MEDICARE

## 2017-06-13 DIAGNOSIS — J45.20 MILD INTERMITTENT ASTHMA WITHOUT COMPLICATION: Primary | ICD-10-CM

## 2017-06-13 PROCEDURE — 99283 EMERGENCY DEPT VISIT LOW MDM: CPT

## 2017-06-14 ENCOUNTER — APPOINTMENT (OUTPATIENT)
Dept: GENERAL RADIOLOGY | Age: 47
End: 2017-06-14
Payer: MEDICARE

## 2017-06-14 VITALS
HEART RATE: 84 BPM | SYSTOLIC BLOOD PRESSURE: 162 MMHG | OXYGEN SATURATION: 96 % | DIASTOLIC BLOOD PRESSURE: 77 MMHG | RESPIRATION RATE: 20 BRPM | TEMPERATURE: 98.8 F

## 2017-06-14 PROCEDURE — 94640 AIRWAY INHALATION TREATMENT: CPT

## 2017-06-14 PROCEDURE — 71020 XR CHEST STANDARD TWO VW: CPT

## 2017-06-14 PROCEDURE — 94664 DEMO&/EVAL PT USE INHALER: CPT

## 2017-06-14 PROCEDURE — 71020 XR CHEST STANDARD TWO VW: CPT | Performed by: RADIOLOGY

## 2017-06-14 PROCEDURE — 6370000000 HC RX 637 (ALT 250 FOR IP): Performed by: EMERGENCY MEDICINE

## 2017-06-14 RX ORDER — IPRATROPIUM BROMIDE AND ALBUTEROL SULFATE 2.5; .5 MG/3ML; MG/3ML
1 SOLUTION RESPIRATORY (INHALATION)
Status: DISCONTINUED | OUTPATIENT
Start: 2017-06-14 | End: 2017-06-14 | Stop reason: HOSPADM

## 2017-06-14 RX ADMIN — IPRATROPIUM BROMIDE AND ALBUTEROL SULFATE 1 AMPULE: .5; 3 SOLUTION RESPIRATORY (INHALATION) at 00:13

## 2017-06-15 ENCOUNTER — OFFICE VISIT (OUTPATIENT)
Dept: PRIMARY CARE CLINIC | Age: 47
End: 2017-06-15
Payer: MEDICARE

## 2017-06-15 VITALS
HEIGHT: 58 IN | RESPIRATION RATE: 16 BRPM | TEMPERATURE: 100.1 F | BODY MASS INDEX: 31.82 KG/M2 | HEART RATE: 107 BPM | OXYGEN SATURATION: 98 % | SYSTOLIC BLOOD PRESSURE: 134 MMHG | WEIGHT: 151.6 LBS | DIASTOLIC BLOOD PRESSURE: 80 MMHG

## 2017-06-15 DIAGNOSIS — J06.9 VIRAL UPPER RESPIRATORY TRACT INFECTION: ICD-10-CM

## 2017-06-15 DIAGNOSIS — R30.0 DYSURIA: Primary | ICD-10-CM

## 2017-06-15 DIAGNOSIS — R05.9 COUGH: ICD-10-CM

## 2017-06-15 LAB
-: ABNORMAL
AMORPHOUS: ABNORMAL
BACTERIA: ABNORMAL
BILIRUBIN URINE: NEGATIVE
CASTS UA: ABNORMAL /LPF (ref 0–2)
COLOR: NORMAL
COMMENT UA: NORMAL
CRYSTALS, UA: ABNORMAL /HPF
EPITHELIAL CELLS UA: ABNORMAL /HPF (ref 0–5)
GLUCOSE URINE: NEGATIVE
KETONES, URINE: NEGATIVE
LEUKOCYTE ESTERASE, URINE: NEGATIVE
MUCUS: ABNORMAL
NITRITE, URINE: NEGATIVE
OTHER OBSERVATIONS UA: ABNORMAL
PH UA: 6 (ref 5–6)
PROTEIN UA: NEGATIVE
RBC UA: ABNORMAL /HPF (ref 0–4)
RENAL EPITHELIAL, UA: ABNORMAL /HPF
SPECIFIC GRAVITY UA: 1.02 (ref 1.01–1.02)
TRICHOMONAS: ABNORMAL
TURBIDITY: NORMAL
URINE HGB: NEGATIVE
UROBILINOGEN, URINE: NORMAL
WBC UA: ABNORMAL /HPF (ref 0–4)
YEAST: ABNORMAL

## 2017-06-15 PROCEDURE — 81001 URINALYSIS AUTO W/SCOPE: CPT | Performed by: PHYSICIAN ASSISTANT

## 2017-06-15 PROCEDURE — 87086 URINE CULTURE/COLONY COUNT: CPT | Performed by: PHYSICIAN ASSISTANT

## 2017-06-15 PROCEDURE — 81003 URINALYSIS AUTO W/O SCOPE: CPT | Performed by: PHYSICIAN ASSISTANT

## 2017-06-15 PROCEDURE — 99214 OFFICE O/P EST MOD 30 MIN: CPT | Performed by: PHYSICIAN ASSISTANT

## 2017-06-15 RX ORDER — BENZONATATE 200 MG/1
200 CAPSULE ORAL 3 TIMES DAILY PRN
Qty: 30 CAPSULE | Refills: 1 | Status: SHIPPED | OUTPATIENT
Start: 2017-06-15 | End: 2017-09-26 | Stop reason: SDUPTHER

## 2017-06-15 RX ORDER — FEXOFENADINE HCL 180 MG/1
180 TABLET ORAL DAILY
COMMUNITY
End: 2017-06-30 | Stop reason: SDUPTHER

## 2017-06-15 RX ORDER — PHENAZOPYRIDINE HYDROCHLORIDE 200 MG/1
200 TABLET, FILM COATED ORAL 3 TIMES DAILY PRN
Qty: 9 TABLET | Refills: 0 | Status: SHIPPED | OUTPATIENT
Start: 2017-06-15 | End: 2017-06-18

## 2017-06-15 RX ORDER — POLYETHYLENE GLYCOL 3350 17 G/17G
17 POWDER, FOR SOLUTION ORAL DAILY
COMMUNITY

## 2017-06-15 ASSESSMENT — ENCOUNTER SYMPTOMS
COUGH: 1
DIARRHEA: 0
VOMITING: 0
SHORTNESS OF BREATH: 1
NAUSEA: 0
CONSTIPATION: 0
HEMOPTYSIS: 1
WHEEZING: 1
RHINORRHEA: 0
CHEST TIGHTNESS: 1

## 2017-06-16 ASSESSMENT — ENCOUNTER SYMPTOMS
SORE THROAT: 0
TROUBLE SWALLOWING: 0

## 2017-06-17 LAB
CULTURE: ABNORMAL
Lab: ABNORMAL
SPECIMEN DESCRIPTION: ABNORMAL
STATUS: ABNORMAL

## 2017-06-30 ENCOUNTER — NURSE ONLY (OUTPATIENT)
Dept: LAB | Age: 47
End: 2017-06-30
Payer: MEDICARE

## 2017-06-30 ENCOUNTER — OFFICE VISIT (OUTPATIENT)
Dept: FAMILY MEDICINE CLINIC | Age: 47
End: 2017-06-30
Payer: MEDICARE

## 2017-06-30 VITALS
BODY MASS INDEX: 31.61 KG/M2 | HEART RATE: 88 BPM | OXYGEN SATURATION: 97 % | RESPIRATION RATE: 12 BRPM | DIASTOLIC BLOOD PRESSURE: 84 MMHG | HEIGHT: 58 IN | SYSTOLIC BLOOD PRESSURE: 128 MMHG | TEMPERATURE: 98.1 F | WEIGHT: 150.6 LBS

## 2017-06-30 DIAGNOSIS — J30.2 SEASONAL ALLERGIC RHINITIS, UNSPECIFIED ALLERGIC RHINITIS TRIGGER: ICD-10-CM

## 2017-06-30 DIAGNOSIS — Z23 NEED FOR PNEUMOCOCCAL VACCINATION: ICD-10-CM

## 2017-06-30 DIAGNOSIS — J45.41 MODERATE PERSISTENT ASTHMA WITH ACUTE EXACERBATION: Primary | ICD-10-CM

## 2017-06-30 PROCEDURE — 90471 IMMUNIZATION ADMIN: CPT | Performed by: NURSE PRACTITIONER

## 2017-06-30 PROCEDURE — 99214 OFFICE O/P EST MOD 30 MIN: CPT | Performed by: NURSE PRACTITIONER

## 2017-06-30 PROCEDURE — 90732 PPSV23 VACC 2 YRS+ SUBQ/IM: CPT | Performed by: NURSE PRACTITIONER

## 2017-06-30 RX ORDER — BENZONATATE 200 MG/1
200 CAPSULE ORAL
COMMUNITY
End: 2017-09-27 | Stop reason: SDUPTHER

## 2017-06-30 RX ORDER — FEXOFENADINE HCL 180 MG/1
180 TABLET ORAL DAILY
Qty: 30 TABLET | Refills: 10 | Status: SHIPPED | OUTPATIENT
Start: 2017-06-30 | End: 2018-04-12 | Stop reason: SDUPTHER

## 2017-06-30 ASSESSMENT — ENCOUNTER SYMPTOMS
RESPIRATORY NEGATIVE: 1
ABDOMINAL PAIN: 0
EYES NEGATIVE: 1
SINUS PRESSURE: 0
TROUBLE SWALLOWING: 0
CHEST TIGHTNESS: 0
CONSTIPATION: 0
ALLERGIC/IMMUNOLOGIC NEGATIVE: 1
VOMITING: 0
NAUSEA: 0
COUGH: 0
SHORTNESS OF BREATH: 0
DIARRHEA: 0
GASTROINTESTINAL NEGATIVE: 1

## 2017-06-30 ASSESSMENT — PATIENT HEALTH QUESTIONNAIRE - PHQ9
2. FEELING DOWN, DEPRESSED OR HOPELESS: 0
1. LITTLE INTEREST OR PLEASURE IN DOING THINGS: 0
SUM OF ALL RESPONSES TO PHQ QUESTIONS 1-9: 0
SUM OF ALL RESPONSES TO PHQ9 QUESTIONS 1 & 2: 0

## 2017-07-21 ENCOUNTER — HOSPITAL ENCOUNTER (EMERGENCY)
Age: 47
Discharge: HOME | End: 2017-07-21
Attending: EMERGENCY MEDICINE
Payer: MEDICARE

## 2017-07-21 VITALS
HEART RATE: 70 BPM | BODY MASS INDEX: 30.31 KG/M2 | TEMPERATURE: 98 F | OXYGEN SATURATION: 98 % | RESPIRATION RATE: 12 BRPM | DIASTOLIC BLOOD PRESSURE: 81 MMHG | WEIGHT: 145 LBS | SYSTOLIC BLOOD PRESSURE: 148 MMHG

## 2017-07-21 DIAGNOSIS — R11.2 NAUSEA VOMITING AND DIARRHEA: Primary | ICD-10-CM

## 2017-07-21 DIAGNOSIS — E86.0 DEHYDRATION: ICD-10-CM

## 2017-07-21 DIAGNOSIS — R19.7 NAUSEA VOMITING AND DIARRHEA: Primary | ICD-10-CM

## 2017-07-21 LAB
-: NORMAL
ABSOLUTE EOS #: 0.1 K/UL (ref 0–0.4)
ABSOLUTE LYMPH #: 2.6 K/UL (ref 1–4.8)
ABSOLUTE MONO #: 0.4 K/UL (ref 0.1–1.2)
ALBUMIN SERPL-MCNC: 4.3 G/DL (ref 3.5–5.2)
ALBUMIN/GLOBULIN RATIO: 1.3 (ref 1–2.5)
ALP BLD-CCNC: 100 U/L (ref 35–104)
ALT SERPL-CCNC: 15 U/L (ref 5–33)
AMORPHOUS: NORMAL
ANION GAP SERPL CALCULATED.3IONS-SCNC: 13 MMOL/L (ref 9–17)
AST SERPL-CCNC: 21 U/L
BACTERIA: NORMAL
BASOPHILS # BLD: 1 %
BASOPHILS ABSOLUTE: 0.1 K/UL (ref 0–0.2)
BILIRUB SERPL-MCNC: 0.51 MG/DL (ref 0.3–1.2)
BILIRUBIN DIRECT: 0.11 MG/DL
BILIRUBIN URINE: NEGATIVE
BILIRUBIN, INDIRECT: 0.4 MG/DL (ref 0–1)
BUN BLDV-MCNC: 8 MG/DL (ref 6–20)
BUN/CREAT BLD: 14 (ref 9–20)
CALCIUM SERPL-MCNC: 8.7 MG/DL (ref 8.6–10.4)
CASTS UA: NORMAL /LPF (ref 0–2)
CHLORIDE BLD-SCNC: 103 MMOL/L (ref 98–107)
CO2: 25 MMOL/L (ref 20–31)
COLOR: ABNORMAL
COMMENT UA: ABNORMAL
CREAT SERPL-MCNC: 0.59 MG/DL (ref 0.5–0.9)
CRYSTALS, UA: NORMAL /HPF
DIFFERENTIAL TYPE: ABNORMAL
EOSINOPHILS RELATIVE PERCENT: 1 %
EPITHELIAL CELLS UA: NORMAL /HPF (ref 0–5)
GFR AFRICAN AMERICAN: >60 ML/MIN
GFR NON-AFRICAN AMERICAN: >60 ML/MIN
GFR SERPL CREATININE-BSD FRML MDRD: ABNORMAL ML/MIN/{1.73_M2}
GFR SERPL CREATININE-BSD FRML MDRD: ABNORMAL ML/MIN/{1.73_M2}
GLOBULIN: 3.3 G/DL (ref 1.5–3.8)
GLUCOSE BLD-MCNC: 103 MG/DL (ref 70–99)
GLUCOSE URINE: NEGATIVE
HCT VFR BLD CALC: 47.1 % (ref 36–46)
HEMOGLOBIN: 15 G/DL (ref 12–16)
KETONES, URINE: NEGATIVE
LEUKOCYTE ESTERASE, URINE: NEGATIVE
LIPASE: 35 U/L (ref 13–60)
LYMPHOCYTES # BLD: 29 %
MCH RBC QN AUTO: 26.7 PG (ref 26–34)
MCHC RBC AUTO-ENTMCNC: 31.8 G/DL (ref 31–37)
MCV RBC AUTO: 84 FL (ref 80–100)
MONOCYTES # BLD: 5 %
MUCUS: NORMAL
NITRITE, URINE: NEGATIVE
OTHER OBSERVATIONS UA: NORMAL
PDW BLD-RTO: 15 % (ref 11–14.5)
PH UA: 6.5 (ref 5–6)
PLATELET # BLD: 330 K/UL (ref 140–450)
PLATELET ESTIMATE: ABNORMAL
PMV BLD AUTO: 8.9 FL (ref 6–12)
POTASSIUM SERPL-SCNC: 3.4 MMOL/L (ref 3.7–5.3)
PROTEIN UA: NEGATIVE
RBC # BLD: 5.61 M/UL (ref 4–5.2)
RBC # BLD: ABNORMAL 10*6/UL
RBC UA: >100 /HPF (ref 0–4)
RENAL EPITHELIAL, UA: NORMAL /HPF
SEG NEUTROPHILS: 64 %
SEGMENTED NEUTROPHILS ABSOLUTE COUNT: 5.7 K/UL (ref 1.8–7.7)
SODIUM BLD-SCNC: 141 MMOL/L (ref 135–144)
SPECIFIC GRAVITY UA: 1.01 (ref 1.01–1.02)
TOTAL PROTEIN: 7.6 G/DL (ref 6.4–8.3)
TRICHOMONAS: NORMAL
TURBIDITY: ABNORMAL
URINE HGB: ABNORMAL
UROBILINOGEN, URINE: NORMAL
WBC # BLD: 8.8 K/UL (ref 3.5–11)
WBC # BLD: ABNORMAL 10*3/UL
WBC UA: NORMAL /HPF (ref 0–4)
YEAST: NORMAL

## 2017-07-21 PROCEDURE — 99283 EMERGENCY DEPT VISIT LOW MDM: CPT

## 2017-07-21 PROCEDURE — 6360000002 HC RX W HCPCS: Performed by: EMERGENCY MEDICINE

## 2017-07-21 PROCEDURE — 2580000003 HC RX 258: Performed by: EMERGENCY MEDICINE

## 2017-07-21 PROCEDURE — 96374 THER/PROPH/DIAG INJ IV PUSH: CPT

## 2017-07-21 PROCEDURE — 96361 HYDRATE IV INFUSION ADD-ON: CPT

## 2017-07-21 PROCEDURE — 85025 COMPLETE CBC W/AUTO DIFF WBC: CPT

## 2017-07-21 PROCEDURE — 80048 BASIC METABOLIC PNL TOTAL CA: CPT

## 2017-07-21 PROCEDURE — 80076 HEPATIC FUNCTION PANEL: CPT

## 2017-07-21 PROCEDURE — 6360000002 HC RX W HCPCS

## 2017-07-21 PROCEDURE — 81001 URINALYSIS AUTO W/SCOPE: CPT

## 2017-07-21 PROCEDURE — 83690 ASSAY OF LIPASE: CPT

## 2017-07-21 RX ORDER — ONDANSETRON 2 MG/ML
4 INJECTION INTRAMUSCULAR; INTRAVENOUS ONCE
Status: COMPLETED | OUTPATIENT
Start: 2017-07-21 | End: 2017-07-21

## 2017-07-21 RX ORDER — 0.9 % SODIUM CHLORIDE 0.9 %
1000 INTRAVENOUS SOLUTION INTRAVENOUS ONCE
Status: COMPLETED | OUTPATIENT
Start: 2017-07-21 | End: 2017-07-21

## 2017-07-21 RX ORDER — ONDANSETRON 4 MG/1
4 TABLET, ORALLY DISINTEGRATING ORAL ONCE
Status: DISCONTINUED | OUTPATIENT
Start: 2017-07-21 | End: 2017-07-21 | Stop reason: HOSPADM

## 2017-07-21 RX ORDER — ONDANSETRON 4 MG/1
4 TABLET, ORALLY DISINTEGRATING ORAL EVERY 8 HOURS PRN
Qty: 12 TABLET | Refills: 0 | Status: SHIPPED | OUTPATIENT
Start: 2017-07-21 | End: 2022-06-10

## 2017-07-21 RX ADMIN — ONDANSETRON 4 MG: 2 INJECTION INTRAMUSCULAR; INTRAVENOUS at 19:02

## 2017-07-21 RX ADMIN — SODIUM CHLORIDE 1000 ML: 9 INJECTION, SOLUTION INTRAVENOUS at 20:05

## 2017-07-21 RX ADMIN — SODIUM CHLORIDE 1000 ML: 9 INJECTION, SOLUTION INTRAVENOUS at 19:02

## 2017-09-14 ENCOUNTER — OFFICE VISIT (OUTPATIENT)
Dept: PRIMARY CARE CLINIC | Age: 47
End: 2017-09-14
Payer: MEDICARE

## 2017-09-14 VITALS
WEIGHT: 153.8 LBS | HEIGHT: 58 IN | BODY MASS INDEX: 32.28 KG/M2 | SYSTOLIC BLOOD PRESSURE: 142 MMHG | RESPIRATION RATE: 16 BRPM | TEMPERATURE: 98.2 F | HEART RATE: 75 BPM | OXYGEN SATURATION: 99 % | DIASTOLIC BLOOD PRESSURE: 90 MMHG

## 2017-09-14 DIAGNOSIS — H66.002 ACUTE SUPPURATIVE OTITIS MEDIA OF LEFT EAR WITHOUT SPONTANEOUS RUPTURE OF TYMPANIC MEMBRANE, RECURRENCE NOT SPECIFIED: Primary | ICD-10-CM

## 2017-09-14 PROCEDURE — 99213 OFFICE O/P EST LOW 20 MIN: CPT | Performed by: PHYSICIAN ASSISTANT

## 2017-09-14 RX ORDER — AMOXICILLIN AND CLAVULANATE POTASSIUM 875; 125 MG/1; MG/1
1 TABLET, FILM COATED ORAL 2 TIMES DAILY
Qty: 20 TABLET | Refills: 0 | Status: SHIPPED | OUTPATIENT
Start: 2017-09-14 | End: 2017-09-24

## 2017-09-14 ASSESSMENT — ENCOUNTER SYMPTOMS
SORE THROAT: 1
COUGH: 1
ABDOMINAL PAIN: 0
RHINORRHEA: 1

## 2017-09-16 ENCOUNTER — OFFICE VISIT (OUTPATIENT)
Dept: PRIMARY CARE CLINIC | Age: 47
End: 2017-09-16
Payer: MEDICARE

## 2017-09-16 VITALS
BODY MASS INDEX: 31.98 KG/M2 | SYSTOLIC BLOOD PRESSURE: 144 MMHG | DIASTOLIC BLOOD PRESSURE: 94 MMHG | OXYGEN SATURATION: 98 % | WEIGHT: 153 LBS | HEART RATE: 77 BPM

## 2017-09-16 DIAGNOSIS — H65.01 RIGHT ACUTE SEROUS OTITIS MEDIA, RECURRENCE NOT SPECIFIED: Primary | ICD-10-CM

## 2017-09-16 DIAGNOSIS — F41.9 ANXIETY: ICD-10-CM

## 2017-09-16 PROCEDURE — 99214 OFFICE O/P EST MOD 30 MIN: CPT | Performed by: NURSE PRACTITIONER

## 2017-09-16 RX ORDER — HYDROXYZINE PAMOATE 25 MG/1
25 CAPSULE ORAL 3 TIMES DAILY PRN
Qty: 30 CAPSULE | Refills: 1 | Status: SHIPPED | OUTPATIENT
Start: 2017-09-16 | End: 2017-10-26 | Stop reason: SDUPTHER

## 2017-09-16 ASSESSMENT — ENCOUNTER SYMPTOMS
RHINORRHEA: 0
SHORTNESS OF BREATH: 0
WHEEZING: 0
SINUS PRESSURE: 0
COUGH: 0
SORE THROAT: 0

## 2017-09-26 DIAGNOSIS — R05.9 COUGH: ICD-10-CM

## 2017-09-27 RX ORDER — BENZONATATE 200 MG/1
CAPSULE ORAL
Qty: 30 CAPSULE | Refills: 0 | Status: SHIPPED | OUTPATIENT
Start: 2017-09-27 | End: 2018-02-05 | Stop reason: ALTCHOICE

## 2017-10-03 DIAGNOSIS — K21.9 GASTROESOPHAGEAL REFLUX DISEASE WITHOUT ESOPHAGITIS: Primary | ICD-10-CM

## 2017-10-03 RX ORDER — PANTOPRAZOLE SODIUM 40 MG/1
40 TABLET, DELAYED RELEASE ORAL
Qty: 30 TABLET | Refills: 3 | Status: SHIPPED | OUTPATIENT
Start: 2017-10-03 | End: 2018-04-12 | Stop reason: SDUPTHER

## 2017-10-25 ENCOUNTER — OFFICE VISIT (OUTPATIENT)
Dept: PRIMARY CARE CLINIC | Age: 47
End: 2017-10-25
Payer: MEDICARE

## 2017-10-25 VITALS
WEIGHT: 149 LBS | BODY MASS INDEX: 31.14 KG/M2 | HEART RATE: 76 BPM | SYSTOLIC BLOOD PRESSURE: 132 MMHG | OXYGEN SATURATION: 98 % | DIASTOLIC BLOOD PRESSURE: 80 MMHG

## 2017-10-25 DIAGNOSIS — J32.9 SINUSITIS, UNSPECIFIED CHRONICITY, UNSPECIFIED LOCATION: ICD-10-CM

## 2017-10-25 DIAGNOSIS — J45.31 MILD PERSISTENT ASTHMA WITH ACUTE EXACERBATION: Primary | ICD-10-CM

## 2017-10-25 DIAGNOSIS — J30.89 ENVIRONMENTAL AND SEASONAL ALLERGIES: ICD-10-CM

## 2017-10-25 PROCEDURE — G8484 FLU IMMUNIZE NO ADMIN: HCPCS | Performed by: NURSE PRACTITIONER

## 2017-10-25 PROCEDURE — G8417 CALC BMI ABV UP PARAM F/U: HCPCS | Performed by: NURSE PRACTITIONER

## 2017-10-25 PROCEDURE — 1036F TOBACCO NON-USER: CPT | Performed by: NURSE PRACTITIONER

## 2017-10-25 PROCEDURE — G8427 DOCREV CUR MEDS BY ELIG CLIN: HCPCS | Performed by: NURSE PRACTITIONER

## 2017-10-25 PROCEDURE — 99202 OFFICE O/P NEW SF 15 MIN: CPT | Performed by: NURSE PRACTITIONER

## 2017-10-25 RX ORDER — TRIAMCINOLONE ACETONIDE 40 MG/ML
40 INJECTION, SUSPENSION INTRA-ARTICULAR; INTRAMUSCULAR ONCE
Status: COMPLETED | OUTPATIENT
Start: 2017-10-25 | End: 2017-10-25

## 2017-10-25 RX ADMIN — TRIAMCINOLONE ACETONIDE 40 MG: 40 INJECTION, SUSPENSION INTRA-ARTICULAR; INTRAMUSCULAR at 18:36

## 2017-10-25 ASSESSMENT — ENCOUNTER SYMPTOMS
COUGH: 1
WHEEZING: 1
SINUS PRESSURE: 1

## 2017-10-25 NOTE — PROGRESS NOTES
Subjective:      Patient ID: Jesus Graves is a 49 y.o. female. Fatigue   This is a recurrent problem. The current episode started more than 1 month ago. The problem occurs intermittently. Associated symptoms include congestion, coughing and fatigue. Treatments tried: Allegra; Vistaril; Tessalon; Albuterol. The treatment provided mild relief. Review of Systems   Constitutional: Positive for fatigue. HENT: Positive for congestion, postnasal drip, sinus pressure and sneezing. Respiratory: Positive for cough and wheezing. Cardiovascular: Negative. Musculoskeletal: Negative. Skin: Negative. Objective:   Physical Exam   Constitutional: She is oriented to person, place, and time. She appears well-developed. HENT:   Head: Normocephalic and atraumatic. Right Ear: Tympanic membrane, external ear and ear canal normal.   Left Ear: Tympanic membrane, external ear and ear canal normal.   Nose: Right sinus exhibits maxillary sinus tenderness and frontal sinus tenderness. Left sinus exhibits maxillary sinus tenderness and frontal sinus tenderness. Mouth/Throat: Uvula is midline, oropharynx is clear and moist and mucous membranes are normal.   Eyes: Conjunctivae, EOM and lids are normal. Pupils are equal, round, and reactive to light. Neck: Trachea normal and normal range of motion. Cardiovascular: Normal rate, regular rhythm, normal heart sounds and normal pulses. Pulmonary/Chest: Effort normal. She has decreased breath sounds in the left lower field. Musculoskeletal: Normal range of motion. Neurological: She is alert and oriented to person, place, and time. Skin: Skin is warm, dry and intact. Vitals:    10/25/17 1813   BP: 132/80   Pulse: 76   SpO2: 98%     I have reviewed pertinent family and social history. Assessment:      1.  Mild persistent asthma with acute exacerbation  mometasone-formoterol (DULERA) 200-5 MCG/ACT inhaler    triamcinolone acetonide (KENALOG-40)

## 2017-10-25 NOTE — PATIENT INSTRUCTIONS
Patient Education        Seasonal Allergies: Care Instructions  Your Care Instructions  Allergies occur when your body's defense system (immune system) overreacts to certain substances. The immune system treats a harmless substance as if it were a harmful germ or virus. Many things can cause this to happen. Examples include pollens, medicine, food, dust, animal dander, and mold. Your allergies are seasonal if you have symptoms just at certain times of the year. In that case, you are probably allergic to pollens from certain trees, grasses, or weeds. Allergies can be mild or severe. Over-the-counter allergy medicine may help with some symptoms. Read and follow all instructions on the label. Managing your allergies is an important part of staying healthy. Your doctor may suggest that you have tests to help find the cause of your allergies. When you know what things trigger your symptoms, you can avoid them. This can prevent allergy symptoms and other health problems. In some cases, immunotherapy might help. For this treatment, you get shots or use pills that have a small amount of certain allergens in them. Your body \"gets used to\" the allergen, so you react less to it over time. This kind of treatment may help prevent or reduce some allergy symptoms. Follow-up care is a key part of your treatment and safety. Be sure to make and go to all appointments, and call your doctor if you are having problems. It's also a good idea to know your test results and keep a list of the medicines you take. How can you care for yourself at home? · Be safe with medicines. Take your medicines exactly as prescribed. Call your doctor if you think you are having a problem with your medicine. · During your allergy season, keep windows closed. If you need to use air-conditioning, change or clean all filters every month. Take a shower and change your clothes after you have been outside. · Stay inside when pollen counts are high. Vacuum once or twice a week. Use a vacuum  with a HEPA filter or a double-thickness filter. When should you call for help? Give an epinephrine shot if:  · You think you are having a severe allergic reaction. After giving an epinephrine shot, call 911, even if you feel better. Call 911 if:  · You have symptoms of a severe allergic reaction. These may include:  ¨ Sudden raised, red areas (hives) all over your body. ¨ Swelling of the throat, mouth, lips, or tongue. ¨ Trouble breathing. ¨ Passing out (losing consciousness). Or you may feel very lightheaded or suddenly feel weak, confused, or restless. · You have been given an epinephrine shot, even if you feel better. Call your doctor now or seek immediate medical care if:  · You have symptoms of an allergic reaction, such as:  ¨ A rash or hives (raised, red areas on the skin). ¨ Itching. ¨ Swelling. ¨ Belly pain, nausea, or vomiting. Watch closely for changes in your health, and be sure to contact your doctor if:  · You do not get better as expected. Where can you learn more? Go to https://[facility] Systemspee-volo.[employer]. org and sign in to your [employer] account. Enter J912 in the [facility] box to learn more about \"Seasonal Allergies: Care Instructions. \"     If you do not have an account, please click on the \"Sign Up Now\" link. Current as of: September 29, 2016  Content Version: 11.3  © 4936-4014 [employer]. Care instructions adapted under license by [facility] [employer] [facility] ([facility]). If you have questions about a medical condition or this instruction, always ask your healthcare professional. [place] [address] any warranty or liability for your use of this information. Patient Education        Controlling Your Asthma: Care Instructions  Your Care Instructions    Asthma is a long-term condition that affects your breathing. It causes the airways that lead to the lungs to swell.  During an asthma attack, the airways swell and narrow. This makes it hard to breathe. You may wheeze or cough. If you have a bad attack, you may need emergency care. There are two things to do to treat asthma. · Control asthma over the long term. · Treat attacks when they occur. You and your doctor can make an asthma action plan. It tells you what medicines you need to take every day to control asthma symptoms and what to do if you have an asthma attack. Your asthma action plan can help prevent and treat attacks. When you keep your asthma under control, you can prevent severe attacks and lasting damage to your airways. You need to treat your asthma even when you are not having symptoms. Although asthma is a lifelong disease, treatment can help control it and help you stay healthy. Follow-up care is a key part of your treatment and safety. Be sure to make and go to all appointments, and call your doctor if you are having problems. It's also a good idea to know your test results and keep a list of the medicines you take. How can you care for yourself at home? To control asthma over the long term  Medicines  Controller medicines reduce swelling in your lungs. They also prevent asthma attacks. Take your controller medicine exactly as prescribed. Talk to your doctor if you have any problems with your medicine. · Inhaled corticosteroid is a common and effective controller medicine. Using it the right way can prevent or reduce most side effects. · Take your controller medicine every day, not just when you have symptoms. It helps prevent problems before they occur. · Your doctor may prescribe another medicine that you use along with the corticosteroid. This is often a long-acting bronchodilator. Do not take this medicine by itself. Using a long-acting bronchodilator by itself can increase your risk of a severe or fatal asthma attack. · Do not take inhaled corticosteroids or long-acting bronchodilators to stop an asthma attack that has already started. instruction, always ask your healthcare professional. [place] [address] any warranty or liability for your use of this information. Patient Education        Sinusitis: Care Instructions  Your Care Instructions    Sinusitis is an infection of the lining of the sinus cavities in your head. Sinusitis often follows a cold. It causes pain and pressure in your head and face. In most cases, sinusitis gets better on its own in 1 to 2 weeks. But some mild symptoms may last for several weeks. Sometimes antibiotics are needed. Follow-up care is a key part of your treatment and safety. Be sure to make and go to all appointments, and call your doctor if you are having problems. It's also a good idea to know your test results and keep a list of the medicines you take. How can you care for yourself at home? · Take an over-the-counter pain medicine, such as acetaminophen (Tylenol), ibuprofen (Advil, Motrin), or naproxen (Aleve). Read and follow all instructions on the label. · If the doctor prescribed antibiotics, take them as directed. Do not stop taking them just because you feel better. You need to take the full course of antibiotics. · Be careful when taking over-the-counter cold or flu medicines and Tylenol at the same time. Many of these medicines have acetaminophen, which is Tylenol. Read the labels to make sure that you are not taking more than the recommended dose. Too much acetaminophen (Tylenol) can be harmful. · Breathe warm, moist air from a steamy shower, a hot bath, or a sink filled with hot water. Avoid cold, dry air. Using a humidifier in your home may help. Follow the directions for cleaning the machine. · Use saline (saltwater) nasal washes to help keep your nasal passages open and wash out mucus and bacteria. You can buy saline nose drops at a grocery store or drugstore.  Or you can make your own at home by adding 1 teaspoon of salt and 1 teaspoon of baking soda to 2 cups of distilled water. If you make your own, fill a bulb syringe with the solution, insert the tip into your nostril, and squeeze gently. Sherryn Anis your nose. · Put a hot, wet towel or a warm gel pack on your face 3 or 4 times a day for 5 to 10 minutes each time. · Try a decongestant nasal spray like oxymetazoline (Afrin). Do not use it for more than 3 days in a row. Using it for more than 3 days can make your congestion worse. When should you call for help? Call your doctor now or seek immediate medical care if:  · You have new or worse swelling or redness in your face or around your eyes. · You have a new or higher fever. Watch closely for changes in your health, and be sure to contact your doctor if:  · You have new or worse facial pain. · The mucus from your nose becomes thicker (like pus) or has new blood in it. · You are not getting better as expected. Where can you learn more? Go to https://[employer]. org and sign in to your [employer] account. Enter V543 in the [employer] box to learn more about \"Sinusitis: Care Instructions. \"     If you do not have an account, please click on the \"Sign Up Now\" link. Current as of: July 29, 2016  Content Version: 11.3  © 1771-1533 [employer]. Care instructions adapted under license by [facility] ([facility]). If you have questions about a medical condition or this instruction, always ask your healthcare professional. [place] [address] any warranty or liability for your use of this information.

## 2017-10-26 DIAGNOSIS — F41.9 ANXIETY: Primary | ICD-10-CM

## 2017-10-26 RX ORDER — HYDROXYZINE PAMOATE 25 MG/1
25 CAPSULE ORAL 3 TIMES DAILY PRN
Qty: 30 CAPSULE | Refills: 0 | Status: SHIPPED | OUTPATIENT
Start: 2017-10-26 | End: 2018-04-12 | Stop reason: SDUPTHER

## 2017-11-24 ENCOUNTER — APPOINTMENT (OUTPATIENT)
Dept: GENERAL RADIOLOGY | Age: 47
End: 2017-11-24
Payer: MEDICARE

## 2017-11-24 ENCOUNTER — HOSPITAL ENCOUNTER (EMERGENCY)
Age: 47
Discharge: HOME | End: 2017-11-25
Attending: EMERGENCY MEDICINE
Payer: MEDICARE

## 2017-11-24 DIAGNOSIS — F41.9 ANXIETY: Primary | ICD-10-CM

## 2017-11-24 LAB
ABSOLUTE EOS #: 0.1 K/UL (ref 0–0.4)
ABSOLUTE IMMATURE GRANULOCYTE: ABNORMAL K/UL (ref 0–0.3)
ABSOLUTE LYMPH #: 2.4 K/UL (ref 1–4.8)
ABSOLUTE MONO #: 0.6 K/UL (ref 0.1–1.2)
BASOPHILS # BLD: 1 % (ref 0–1)
BASOPHILS ABSOLUTE: 0.1 K/UL (ref 0–0.2)
DIFFERENTIAL TYPE: ABNORMAL
EKG ATRIAL RATE: 72 BPM
EKG P AXIS: 44 DEGREES
EKG P-R INTERVAL: 160 MS
EKG Q-T INTERVAL: 384 MS
EKG QRS DURATION: 78 MS
EKG QTC CALCULATION (BAZETT): 420 MS
EKG R AXIS: 27 DEGREES
EKG T AXIS: 21 DEGREES
EKG VENTRICULAR RATE: 72 BPM
EOSINOPHILS RELATIVE PERCENT: 1 % (ref 1–7)
HCT VFR BLD CALC: 46.2 % (ref 36–46)
HEMOGLOBIN: 15.2 G/DL (ref 12–16)
IMMATURE GRANULOCYTES: ABNORMAL %
LYMPHOCYTES # BLD: 22 % (ref 16–46)
MCH RBC QN AUTO: 27.9 PG (ref 26–34)
MCHC RBC AUTO-ENTMCNC: 32.9 G/DL (ref 31–37)
MCV RBC AUTO: 84.9 FL (ref 80–100)
MONOCYTES # BLD: 6 % (ref 4–11)
PDW BLD-RTO: 14.7 % (ref 11–14.5)
PLATELET # BLD: 322 K/UL (ref 140–450)
PLATELET ESTIMATE: ABNORMAL
PMV BLD AUTO: 8.9 FL (ref 6–12)
RBC # BLD: 5.44 M/UL (ref 4–5.2)
RBC # BLD: ABNORMAL 10*6/UL
SEG NEUTROPHILS: 70 % (ref 43–77)
SEGMENTED NEUTROPHILS ABSOLUTE COUNT: 7.5 K/UL (ref 1.8–7.7)
WBC # BLD: 10.8 K/UL (ref 3.5–11)
WBC # BLD: ABNORMAL 10*3/UL

## 2017-11-24 PROCEDURE — 93005 ELECTROCARDIOGRAM TRACING: CPT

## 2017-11-24 PROCEDURE — 99285 EMERGENCY DEPT VISIT HI MDM: CPT

## 2017-11-24 PROCEDURE — 82550 ASSAY OF CK (CPK): CPT

## 2017-11-24 PROCEDURE — 71010 XR CHEST PORTABLE: CPT

## 2017-11-24 PROCEDURE — 36415 COLL VENOUS BLD VENIPUNCTURE: CPT

## 2017-11-24 PROCEDURE — 82553 CREATINE MB FRACTION: CPT

## 2017-11-24 PROCEDURE — 83874 ASSAY OF MYOGLOBIN: CPT

## 2017-11-24 PROCEDURE — 85025 COMPLETE CBC W/AUTO DIFF WBC: CPT

## 2017-11-24 PROCEDURE — 84484 ASSAY OF TROPONIN QUANT: CPT

## 2017-11-24 PROCEDURE — 80048 BASIC METABOLIC PNL TOTAL CA: CPT

## 2017-11-24 PROCEDURE — 6360000002 HC RX W HCPCS: Performed by: EMERGENCY MEDICINE

## 2017-11-24 PROCEDURE — 96374 THER/PROPH/DIAG INJ IV PUSH: CPT

## 2017-11-24 RX ORDER — LORAZEPAM 2 MG/ML
1 INJECTION INTRAMUSCULAR ONCE
Status: COMPLETED | OUTPATIENT
Start: 2017-11-24 | End: 2017-11-24

## 2017-11-24 RX ADMIN — LORAZEPAM 1 MG: 2 INJECTION INTRAMUSCULAR; INTRAVENOUS at 23:43

## 2017-11-24 ASSESSMENT — PAIN DESCRIPTION - ORIENTATION: ORIENTATION: LEFT

## 2017-11-24 ASSESSMENT — ENCOUNTER SYMPTOMS
RESPIRATORY NEGATIVE: 1
EYES NEGATIVE: 1
GASTROINTESTINAL NEGATIVE: 1

## 2017-11-24 ASSESSMENT — PAIN DESCRIPTION - LOCATION: LOCATION: CHEST;ARM

## 2017-11-24 ASSESSMENT — PAIN DESCRIPTION - FREQUENCY: FREQUENCY: CONTINUOUS

## 2017-11-24 ASSESSMENT — PAIN SCALES - GENERAL: PAINLEVEL_OUTOF10: 8

## 2017-11-24 ASSESSMENT — PAIN DESCRIPTION - PAIN TYPE: TYPE: ACUTE PAIN

## 2017-11-24 ASSESSMENT — PAIN DESCRIPTION - PROGRESSION: CLINICAL_PROGRESSION: NOT CHANGED

## 2017-11-24 ASSESSMENT — PAIN DESCRIPTION - DESCRIPTORS: DESCRIPTORS: PRESSURE

## 2017-11-25 VITALS
HEART RATE: 80 BPM | DIASTOLIC BLOOD PRESSURE: 86 MMHG | WEIGHT: 140 LBS | OXYGEN SATURATION: 96 % | BODY MASS INDEX: 28.22 KG/M2 | SYSTOLIC BLOOD PRESSURE: 148 MMHG | TEMPERATURE: 97.7 F | HEIGHT: 59 IN | RESPIRATION RATE: 16 BRPM

## 2017-11-25 LAB
ANION GAP SERPL CALCULATED.3IONS-SCNC: 13 MMOL/L (ref 9–17)
BUN BLDV-MCNC: 9 MG/DL (ref 6–20)
BUN/CREAT BLD: 16 (ref 9–20)
CALCIUM SERPL-MCNC: 9.1 MG/DL (ref 8.6–10.4)
CHLORIDE BLD-SCNC: 99 MMOL/L (ref 98–107)
CK MB: 2.9 NG/ML
CO2: 27 MMOL/L (ref 20–31)
CREAT SERPL-MCNC: 0.57 MG/DL (ref 0.5–0.9)
GFR AFRICAN AMERICAN: >60 ML/MIN
GFR NON-AFRICAN AMERICAN: >60 ML/MIN
GFR SERPL CREATININE-BSD FRML MDRD: NORMAL ML/MIN/{1.73_M2}
GFR SERPL CREATININE-BSD FRML MDRD: NORMAL ML/MIN/{1.73_M2}
GLUCOSE BLD-MCNC: 97 MG/DL (ref 70–99)
MYOGLOBIN: 24 NG/ML (ref 25–58)
POC TROPONIN I: 0 NG/ML (ref 0–0.08)
POC TROPONIN INTERP: NORMAL
POTASSIUM SERPL-SCNC: 4.2 MMOL/L (ref 3.7–5.3)
SODIUM BLD-SCNC: 139 MMOL/L (ref 135–144)
TOTAL CK: 118 U/L (ref 26–192)

## 2017-11-25 RX ORDER — ALPRAZOLAM 0.5 MG/1
0.5 TABLET ORAL 2 TIMES DAILY
Qty: 10 TABLET | Refills: 0 | Status: SHIPPED | OUTPATIENT
Start: 2017-11-25 | End: 2017-11-30

## 2017-11-25 NOTE — ED NOTES
Discharge instructions and rx x1 given. Pt verbalized understanding. Drowsy, but ambulates to  with steady gait. Home with friend. Condition stable.      Carmine Velasquez RN  11/25/17 0734
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
## 2017-12-07 ENCOUNTER — HOSPITAL ENCOUNTER (EMERGENCY)
Age: 47
Discharge: HOME | End: 2017-12-07
Attending: EMERGENCY MEDICINE
Payer: MEDICARE

## 2017-12-07 ENCOUNTER — APPOINTMENT (OUTPATIENT)
Dept: GENERAL RADIOLOGY | Age: 47
End: 2017-12-07
Payer: MEDICARE

## 2017-12-07 VITALS
SYSTOLIC BLOOD PRESSURE: 141 MMHG | OXYGEN SATURATION: 100 % | TEMPERATURE: 99.1 F | BODY MASS INDEX: 27.29 KG/M2 | WEIGHT: 130 LBS | DIASTOLIC BLOOD PRESSURE: 90 MMHG | HEART RATE: 70 BPM | RESPIRATION RATE: 16 BRPM | HEIGHT: 58 IN

## 2017-12-07 DIAGNOSIS — Z77.9 HISTORY OF EXPOSURE TO TOXINS VIA INHALATION: Primary | ICD-10-CM

## 2017-12-07 LAB
ABSOLUTE EOS #: 0.1 K/UL (ref 0–0.4)
ABSOLUTE IMMATURE GRANULOCYTE: ABNORMAL K/UL (ref 0–0.3)
ABSOLUTE LYMPH #: 1.7 K/UL (ref 1–4.8)
ABSOLUTE MONO #: 0.4 K/UL (ref 0.1–1.2)
ANION GAP SERPL CALCULATED.3IONS-SCNC: 10 MMOL/L (ref 9–17)
BASOPHILS # BLD: 0 % (ref 0–1)
BASOPHILS ABSOLUTE: 0 K/UL (ref 0–0.2)
BUN BLDV-MCNC: 9 MG/DL (ref 6–20)
BUN/CREAT BLD: 14 (ref 9–20)
CALCIUM SERPL-MCNC: 9.4 MG/DL (ref 8.6–10.4)
CHLORIDE BLD-SCNC: 105 MMOL/L (ref 98–107)
CO2: 28 MMOL/L (ref 20–31)
CREAT SERPL-MCNC: 0.66 MG/DL (ref 0.5–0.9)
DIFFERENTIAL TYPE: ABNORMAL
EKG ATRIAL RATE: 85 BPM
EKG P AXIS: 41 DEGREES
EKG P-R INTERVAL: 158 MS
EKG Q-T INTERVAL: 354 MS
EKG QRS DURATION: 78 MS
EKG QTC CALCULATION (BAZETT): 421 MS
EKG R AXIS: 23 DEGREES
EKG T AXIS: 31 DEGREES
EKG VENTRICULAR RATE: 85 BPM
EOSINOPHILS RELATIVE PERCENT: 1 % (ref 1–7)
GFR AFRICAN AMERICAN: >60 ML/MIN
GFR NON-AFRICAN AMERICAN: >60 ML/MIN
GFR SERPL CREATININE-BSD FRML MDRD: NORMAL ML/MIN/{1.73_M2}
GFR SERPL CREATININE-BSD FRML MDRD: NORMAL ML/MIN/{1.73_M2}
GLUCOSE BLD-MCNC: 96 MG/DL (ref 70–99)
HCT VFR BLD CALC: 45.5 % (ref 36–46)
HEMOGLOBIN: 15 G/DL (ref 12–16)
IMMATURE GRANULOCYTES: ABNORMAL %
LYMPHOCYTES # BLD: 22 % (ref 16–46)
MCH RBC QN AUTO: 28 PG (ref 26–34)
MCHC RBC AUTO-ENTMCNC: 33 G/DL (ref 31–37)
MCV RBC AUTO: 84.9 FL (ref 80–100)
MONOCYTES # BLD: 6 % (ref 4–11)
PDW BLD-RTO: 14.5 % (ref 11–14.5)
PLATELET # BLD: 351 K/UL (ref 140–450)
PLATELET ESTIMATE: ABNORMAL
PMV BLD AUTO: 8.5 FL (ref 6–12)
POC TROPONIN I: <0.02 NG/ML (ref 0–0.08)
POC TROPONIN INTERP: NORMAL
POTASSIUM SERPL-SCNC: 4.2 MMOL/L (ref 3.7–5.3)
RBC # BLD: 5.36 M/UL (ref 4–5.2)
RBC # BLD: ABNORMAL 10*6/UL
SEG NEUTROPHILS: 71 % (ref 43–77)
SEGMENTED NEUTROPHILS ABSOLUTE COUNT: 5.6 K/UL (ref 1.8–7.7)
SODIUM BLD-SCNC: 143 MMOL/L (ref 135–144)
WBC # BLD: 7.8 K/UL (ref 3.5–11)
WBC # BLD: ABNORMAL 10*3/UL

## 2017-12-07 PROCEDURE — 93005 ELECTROCARDIOGRAM TRACING: CPT

## 2017-12-07 PROCEDURE — 80048 BASIC METABOLIC PNL TOTAL CA: CPT

## 2017-12-07 PROCEDURE — 85025 COMPLETE CBC W/AUTO DIFF WBC: CPT

## 2017-12-07 PROCEDURE — 36415 COLL VENOUS BLD VENIPUNCTURE: CPT

## 2017-12-07 PROCEDURE — 84484 ASSAY OF TROPONIN QUANT: CPT

## 2017-12-07 PROCEDURE — 99285 EMERGENCY DEPT VISIT HI MDM: CPT

## 2017-12-07 PROCEDURE — 71010 XR CHEST PORTABLE: CPT

## 2017-12-07 ASSESSMENT — PAIN DESCRIPTION - ORIENTATION: ORIENTATION: UPPER;LEFT

## 2017-12-07 ASSESSMENT — PAIN SCALES - GENERAL: PAINLEVEL_OUTOF10: 6

## 2017-12-07 ASSESSMENT — PAIN DESCRIPTION - PAIN TYPE: TYPE: ACUTE PAIN

## 2017-12-07 ASSESSMENT — PAIN DESCRIPTION - LOCATION: LOCATION: BACK

## 2017-12-07 NOTE — ED PROVIDER NOTES
[facility] ED  [place] [address]  Phone: [phone]    Pt Name: Lidia Helms  MRN: [id number]  [place] 1970  Date of evaluation: 12/7/2017      CHIEF COMPLAINT       Chief Complaint   Patient presents with    Shoulder Pain     Pt states sprayed her bathroom with lysol, bent over, became lightheaded and short of breath, and had a pain in her left shoulder blade. Pain remains with movement. Some dizziness remains at times. Pt admits to high anxiety. No acute distress.  Dizziness    Shortness of Breath         HISTORY OF PRESENT ILLNESS     Lidia Helms is a 49 y.o. female who presents After spraying Lysol and then inhaling a lot of it and developing pain in her left shoulder blade along with lightheadedness and shortness of breath. Pain is worse with movement. Pain is currently a 6 out of a 10. Symptoms are improving since she got in the ambulance and in the fresh air. This happened before. Her blood pressure is 180/105 on admission but other vital signs are normal.  Except inhaler for asthma after this happened and that seemed to help also. No wheezing upon admission. She is a history of hypertension and asthma in the past.  No history of MI. She has never been a smoker. REVIEW OF SYSTEMS         REVIEW OF SYSTEMS    Constitutional: As above  Eyes:  Denies vision changes  HEENT:  Denies sore throat or neck pain   Respiratory: As above  Cardiovascular:  Denies chest pain  GI:  Some minimal vomiting of phlegm but no evidence of GI bleed. Musculoskeletal:  As  Skin:  No rash on exposed surfaces   Neurologic:  Normal baseline mentation. No new deficits. Lymphatic:   No nodes or infection  Psychiatric:  No psychosis. Alert and interacting normally. Other ROS negative except as noted above. PAST MEDICAL HISTORY    has a past medical history of Anxiety; Asthma; and HTN (hypertension).     SURGICAL HISTORY      has a past surgical history that includes  section (2003);  section (2005);  section (2010); and Tubal ligation (Bilateral, 2010). CURRENT MEDICATIONS       Previous Medications    ALBUTEROL SULFATE HFA (VENTOLIN HFA) 108 (90 BASE) MCG/ACT INHALER    Inhale 2 puffs into the lungs every 6 hours as needed for Wheezing    ASPIRIN 81 MG EC TABLET    Take 1 tablet by mouth daily    BENZONATATE (TESSALON) 200 MG CAPSULE    TAKE ONE CAPSULE BY MOUTH THREE TIMES A DAY AS NEEDED COUGH    FEXOFENADINE (ALLEGRA ALLERGY) 180 MG TABLET    Take 1 tablet by mouth daily    HYDROXYZINE (VISTARIL) 25 MG CAPSULE    Take 1 capsule by mouth 3 times daily as needed for Anxiety    MOMETASONE-FORMOTEROL (DULERA) 200-5 MCG/ACT INHALER    Inhale 1 puff into the lungs 2 times daily    MOMETASONE-FORMOTEROL (DULERA) 200-5 MCG/ACT INHALER    Inhale 2 puffs into the lungs every 12 hours RINSE MOUTH AFTER USE    ONDANSETRON (ZOFRAN ODT) 4 MG DISINTEGRATING TABLET    Take 1 tablet by mouth every 8 hours as needed for Nausea    PANTOPRAZOLE (PROTONIX) 40 MG TABLET    Take 1 tablet by mouth every morning (before breakfast)    POLYETHYLENE GLYCOL (GLYCOLAX) POWDER    Take 17 g by mouth daily       ALLERGIES     has No Known Allergies. FAMILY HISTORY     indicated that the status of her father is unknown.      family history includes Coronary Art Dis in her father. SOCIAL HISTORY      reports that she is a non-smoker but has been exposed to tobacco smoke. She has never used smokeless tobacco. She reports that she does not drink alcohol or use drugs. PHYSICAL EXAM     INITIAL VITALS:  height is 4' 10\" (1.473 m) and weight is 130 lb (59 kg). Her tympanic temperature is 99.1 °F (37.3 °C). Her blood pressure is 180/105 (abnormal) and her pulse is 91. Her respiration is 18 and oxygen saturation is 96%. Constitutional: The patient is alert and well-developed, vital signs as noted.    Psychiatric: Oriented to time, place and person, affect is appropriate for age. Eyes: Pupils equal and reactive to light. EOMI. Ears, nose, and throat: Oropharynx clear  Neck: No masses, trachea midline, and no thyromegaly. Respiratory: Clear to auscultation, full aeration throughout all fields. Nares accessory muscles with respiration. No cyanosis. Cardiovascular: No murmurs, heart sounds normal, no thrills. Gastrointestinal: No masses, no hepatosplenomegaly, bowel sounds positive. No tenderness. Skin: No rashes or lesions on exposed surfaces, good skin turgor. Neurological: no focal neurological findings. Extremities: Good range of motion, no edema. DIFFERENTIAL DIAGNOSIS/ MEDICAL DECISION MAKING:     Resolution of symptoms throughout the ED stay    No evidence of acute cardiopulmonary disorder    Follow Exit Care instructions closely. I have reviewed the disposition diagnosis with the patient and or their family/guardian. I have answered their questions and given discharge instructions. They voiced understanding of these instructions and did not have any further questions or complaints. DIAGNOSTIC RESULTS     RADIOLOGY:   Non-plain film images such as CT, Ultrasound and MRI are read by the radiologist. Kristi Drop radiographic images are visualized and preliminarily interpreted by the emergency physician with the below findings:  XR Chest Portable   Final Result   No acute pulmonary process.              LABS:  Results for orders placed or performed during the hospital encounter of 37/71/05   Basic Metabolic Panel   Result Value Ref Range    Glucose 96 70 - 99 mg/dL    BUN 9 6 - 20 mg/dL    CREATININE 0.66 0.50 - 0.90 mg/dL    Bun/Cre Ratio 14 9 - 20    Calcium 9.4 8.6 - 10.4 mg/dL    Sodium 143 135 - 144 mmol/L    Potassium 4.2 3.7 - 5.3 mmol/L    Chloride 105 98 - 107 mmol/L    CO2 28 20 - 31 mmol/L    Anion Gap 10 9 - 17 mmol/L    GFR Non-African American >60 >60 mL/min    GFR African American >60 >60 mL/min    GFR Comment 12/07/17 1435   BP: (!) 180/105   Pulse: 91   Resp: 18   Temp: 99.1 °F (37.3 °C)   TempSrc: Tympanic   SpO2: 96%   Weight: 130 lb (59 kg)   Height: 4' 10\" (1.473 m)     -------------------------  BP: (!) 180/105, Temp: 99.1 °F (37.3 °C), Pulse: 91, Resp: 18    See DDX/MDM (Differential Diagnosis/Medical Decision Making) above      FINAL IMPRESSION      1.  History of exposure to toxins via inhalation          DISPOSITION/PLAN   DISPOSITION Decision to Discharge    Condition on Disposition    Fair    PATIENT REFERRED TO:  Alissa Masrh NP  [facility]  [phone]    In 4 days        DISCHARGE MEDICATIONS:  New Prescriptions    No medications on file       (Please note that portions of this note were completed with a voice recognition program.  Efforts were made to edit the dictations but occasionally words are mis-transcribed.)    Lucas DO  Attending Emergency Physician       [address] [place] [address],   12/07/17 0260

## 2018-02-05 ENCOUNTER — OFFICE VISIT (OUTPATIENT)
Dept: PRIMARY CARE CLINIC | Age: 48
End: 2018-02-05
Payer: MEDICARE

## 2018-02-05 VITALS
HEIGHT: 59 IN | DIASTOLIC BLOOD PRESSURE: 114 MMHG | TEMPERATURE: 99.1 F | BODY MASS INDEX: 30.6 KG/M2 | HEART RATE: 78 BPM | SYSTOLIC BLOOD PRESSURE: 158 MMHG | WEIGHT: 151.8 LBS | OXYGEN SATURATION: 96 % | RESPIRATION RATE: 16 BRPM

## 2018-02-05 DIAGNOSIS — F41.9 ANXIETY AND DEPRESSION: Primary | ICD-10-CM

## 2018-02-05 DIAGNOSIS — F32.A ANXIETY AND DEPRESSION: Primary | ICD-10-CM

## 2018-02-05 PROCEDURE — G8417 CALC BMI ABV UP PARAM F/U: HCPCS | Performed by: NURSE PRACTITIONER

## 2018-02-05 PROCEDURE — G8427 DOCREV CUR MEDS BY ELIG CLIN: HCPCS | Performed by: NURSE PRACTITIONER

## 2018-02-05 PROCEDURE — G8484 FLU IMMUNIZE NO ADMIN: HCPCS | Performed by: NURSE PRACTITIONER

## 2018-02-05 PROCEDURE — 99214 OFFICE O/P EST MOD 30 MIN: CPT | Performed by: NURSE PRACTITIONER

## 2018-02-05 PROCEDURE — 1036F TOBACCO NON-USER: CPT | Performed by: NURSE PRACTITIONER

## 2018-02-05 RX ORDER — HYDROXYZINE 50 MG/1
50 TABLET, FILM COATED ORAL EVERY 8 HOURS PRN
Qty: 60 TABLET | Refills: 1 | Status: SHIPPED | OUTPATIENT
Start: 2018-02-05 | End: 2018-03-07

## 2018-02-05 ASSESSMENT — ENCOUNTER SYMPTOMS: RESPIRATORY NEGATIVE: 1

## 2018-02-05 NOTE — PROGRESS NOTES
sulfate HFA (VENTOLIN HFA) 108 (90 BASE) MCG/ACT inhaler Inhale 2 puffs into the lungs every 6 hours as needed for Wheezing 1 Inhaler 3    aspirin 81 MG EC tablet Take 1 tablet by mouth daily 30 tablet 3    hydrOXYzine (VISTARIL) 25 MG capsule Take 1 capsule by mouth 3 times daily as needed for Anxiety 30 capsule 0    mometasone-formoterol (DULERA) 200-5 MCG/ACT inhaler Inhale 1 puff into the lungs 2 times daily 60 puff 1     No current facility-administered medications for this visit.

## 2018-02-05 NOTE — PATIENT INSTRUCTIONS
Patient Education        Learning About Anxiety Disorders  What are anxiety disorders? Anxiety disorders are a type of medical problem. They cause severe anxiety. When you feel anxious, you feel that something bad is about to happen. This feeling interferes with your life. These disorders include:  · Generalized anxiety disorder. You feel worried and stressed about many everyday events and activities. This goes on for several months and disrupts your life on most days. · Panic disorder. You have repeated panic attacks. A panic attack is a sudden, intense fear or anxiety. It may make you feel short of breath. Your heart may pound. · Social anxiety disorder. You feel very anxious about what you will say or do in front of people. For example, you may be scared to talk or eat in public. This problem affects your daily life. · Phobias. You are very scared of a specific object, situation, or activity. For example, you may fear spiders, high places, or small spaces. What are the symptoms? Generalized anxiety disorder  Symptoms may include:  · Feeling worried and stressed about many things almost every day. · Feeling tired or irritable. You may have a hard time concentrating. · Having headaches or muscle aches. · Having a hard time getting to sleep or staying asleep. Panic disorder  You may have repeated panic attacks when there is no reason for feeling afraid. You may change your daily activities because you worry that you will have another attack. Symptoms may include:  · Intense fear, terror, or anxiety. · Trouble breathing or very fast breathing. · Chest pain or tightness. · A heartbeat that races or is not regular. Social anxiety disorder  Symptoms may include:  · Fear about a social situation, such as eating in front of others or speaking in public. You may worry a lot. Or you may be afraid that something bad will happen. · Anxiety that can cause you to blush, sweat, and feel shaky.   · A heartbeat Care instructions adapted under license by [facility] ([facility]). If you have questions about a medical condition or this instruction, always ask your healthcare professional. [address] any warranty or liability for your use of this information. Patient Education        Learning About Mood Disorders  What are mood disorders? Mood disorders are medical problems that affect how you feel. They can impact your moods, thoughts, and actions. Mood disorders include:  · Depression. This causes you to feel sad or hopeless for much of the time. · Bipolar disorder. This causes extreme mood changes from manic episodes of very high energy to extreme lows of depression. · Seasonal affective disorder (SAD). This is a type of depression that affects you during the same season each year. Most often people experience SAD during the fall and winter months when days are shorter and there is less light. What are the symptoms? Depression  You may:  · Feel sad or hopeless nearly every day. · Lose interest in or not get pleasure from most daily activities. You feel this way nearly every day. · Have low energy, changes in your appetite, or changes in how well you sleep. · Have trouble concentrating. · Think about death and suicide. Keep the numbers for these national suicide hotlines: 9-871-737-TALK (3-469.925.3121) and 6-796-UKBDDJW (4-746.164.6874). If you or someone you know talks about suicide or feeling hopeless, get help right away. Bipolar disorder  Symptoms depend on your mood swings. You may:  · Feel very happy, energetic, or on edge. · Feel like you need very little sleep. · Feel overly self-confident. · Do impulsive things, such as spending a lot of money. · Feel sad or hopeless. · Have racing thoughts or trouble thinking and making decisions. · Lose interest in things you have enjoyed in the past.  · Think about death and suicide.  Keep the numbers for these national suicide hotlines: take.  Where can you learn more? Go to https://chpepiceweb.healthNotorious. org and sign in to your GoPollGohart account. Enter K802 in the [employer] box to learn more about \"Learning About Mood Disorders. \"     If you do not have an account, please click on the \"Sign Up Now\" link. Current as of: May 12, 2017  Content Version: 11.5  © 7378-8542 Healthwise, [employer]. Care instructions adapted under license by [facility] ([facility]). If you have questions about a medical condition or this instruction, always ask your healthcare professional. [place] [address] any warranty or liability for your use of this information. [facility]        Teddarryn Garber , [address]   [phone]   Fax: [phone]   [facility]: [facility]  [address] [place] [address] , [address] [place] [address]  [phone]   Fax: [phone]  Laith Farah , [address] [place] [address]  [phone]  Fax: [address] [facility]  [phone]   Fax: [phone]

## 2018-02-12 ENCOUNTER — NURSE ONLY (OUTPATIENT)
Dept: LAB | Age: 48
End: 2018-02-12
Payer: MEDICARE

## 2018-02-12 DIAGNOSIS — Z23 NEED FOR VACCINATION: Primary | ICD-10-CM

## 2018-02-12 PROCEDURE — 90686 IIV4 VACC NO PRSV 0.5 ML IM: CPT | Performed by: NURSE PRACTITIONER

## 2018-02-12 PROCEDURE — 90471 IMMUNIZATION ADMIN: CPT | Performed by: NURSE PRACTITIONER

## 2018-02-12 NOTE — PROGRESS NOTES
Have you had an allergic reaction to the flu (influenza) shot? no  Are you allergic to eggs or any component of the flu vaccine? no  Do you have a history of Guillain-[place] Syndrome (GBS), which is paralysis after receiving the flu vaccine? no  Are you feeling well today? yes  Flu vaccine given as ordered. Patient tolerated it well. No questions re: VIS information.

## 2018-03-01 ENCOUNTER — TELEPHONE (OUTPATIENT)
Dept: PRIMARY CARE CLINIC | Age: 48
End: 2018-03-01

## 2018-03-01 ENCOUNTER — HOSPITAL ENCOUNTER (EMERGENCY)
Age: 48
Discharge: HOME | End: 2018-03-01
Attending: EMERGENCY MEDICINE
Payer: MEDICARE

## 2018-03-01 ENCOUNTER — APPOINTMENT (OUTPATIENT)
Dept: GENERAL RADIOLOGY | Age: 48
End: 2018-03-01
Payer: MEDICARE

## 2018-03-01 VITALS
DIASTOLIC BLOOD PRESSURE: 86 MMHG | HEART RATE: 73 BPM | TEMPERATURE: 99.1 F | WEIGHT: 150 LBS | BODY MASS INDEX: 30.3 KG/M2 | OXYGEN SATURATION: 98 % | SYSTOLIC BLOOD PRESSURE: 137 MMHG | RESPIRATION RATE: 12 BRPM

## 2018-03-01 DIAGNOSIS — R07.9 CHEST PAIN, UNSPECIFIED TYPE: Primary | ICD-10-CM

## 2018-03-01 LAB
ABSOLUTE EOS #: 0 K/UL (ref 0–0.4)
ABSOLUTE IMMATURE GRANULOCYTE: ABNORMAL K/UL (ref 0–0.3)
ABSOLUTE LYMPH #: 1.5 K/UL (ref 1–4.8)
ABSOLUTE MONO #: 0.3 K/UL (ref 0.1–1.2)
AMPHETAMINE SCREEN URINE: NEGATIVE
ANION GAP SERPL CALCULATED.3IONS-SCNC: 13 MMOL/L (ref 9–17)
BARBITURATE SCREEN URINE: NEGATIVE
BASOPHILS # BLD: 1 % (ref 0–1)
BASOPHILS ABSOLUTE: 0 K/UL (ref 0–0.2)
BENZODIAZEPINE SCREEN, URINE: NEGATIVE
BUN BLDV-MCNC: 12 MG/DL (ref 6–20)
BUN/CREAT BLD: 18 (ref 9–20)
BUPRENORPHINE URINE: NEGATIVE
CALCIUM SERPL-MCNC: 8.8 MG/DL (ref 8.6–10.4)
CANNABINOID SCREEN URINE: NEGATIVE
CHLORIDE BLD-SCNC: 101 MMOL/L (ref 98–107)
CO2: 25 MMOL/L (ref 20–31)
COCAINE METABOLITE, URINE: NEGATIVE
CREAT SERPL-MCNC: 0.67 MG/DL (ref 0.5–0.9)
DIFFERENTIAL TYPE: ABNORMAL
EOSINOPHILS RELATIVE PERCENT: 1 % (ref 1–7)
GFR AFRICAN AMERICAN: >60 ML/MIN
GFR NON-AFRICAN AMERICAN: >60 ML/MIN
GFR SERPL CREATININE-BSD FRML MDRD: ABNORMAL ML/MIN/{1.73_M2}
GFR SERPL CREATININE-BSD FRML MDRD: ABNORMAL ML/MIN/{1.73_M2}
GLUCOSE BLD-MCNC: 130 MG/DL (ref 70–99)
HCT VFR BLD CALC: 45.5 % (ref 36–46)
HEMOGLOBIN: 15.1 G/DL (ref 12–16)
IMMATURE GRANULOCYTES: ABNORMAL %
INR BLD: 1
LYMPHOCYTES # BLD: 21 % (ref 16–46)
MCH RBC QN AUTO: 27.7 PG (ref 26–34)
MCHC RBC AUTO-ENTMCNC: 33.2 G/DL (ref 31–37)
MCV RBC AUTO: 83.6 FL (ref 80–100)
MDMA URINE: NORMAL
METHADONE SCREEN, URINE: NEGATIVE
METHAMPHETAMINE, URINE: NEGATIVE
MONOCYTES # BLD: 4 % (ref 4–11)
NRBC AUTOMATED: ABNORMAL PER 100 WBC
OPIATES, URINE: NEGATIVE
OXYCODONE SCREEN URINE: NEGATIVE
PARTIAL THROMBOPLASTIN TIME: 28.5 SEC (ref 27–35)
PDW BLD-RTO: 14.9 % (ref 11–14.5)
PHENCYCLIDINE, URINE: NEGATIVE
PLATELET # BLD: 315 K/UL (ref 140–450)
PLATELET ESTIMATE: ABNORMAL
PMV BLD AUTO: 9.2 FL (ref 6–12)
POTASSIUM SERPL-SCNC: 3.8 MMOL/L (ref 3.7–5.3)
PROPOXYPHENE, URINE: NEGATIVE
PROTHROMBIN TIME: 10.7 SEC (ref 9.4–11.3)
RBC # BLD: 5.44 M/UL (ref 4–5.2)
RBC # BLD: ABNORMAL 10*6/UL
SEG NEUTROPHILS: 73 % (ref 43–77)
SEGMENTED NEUTROPHILS ABSOLUTE COUNT: 5.3 K/UL (ref 1.8–7.7)
SODIUM BLD-SCNC: 139 MMOL/L (ref 135–144)
TEST INFORMATION: NORMAL
THYROXINE, FREE: 1.13 NG/DL (ref 0.93–1.7)
TRICYCLIC ANTIDEPRESSANTS, UR: NEGATIVE
TROPONIN INTERP: NORMAL
TROPONIN INTERP: NORMAL
TROPONIN T: <0.03 NG/ML
TROPONIN T: <0.03 NG/ML
TSH SERPL DL<=0.05 MIU/L-ACNC: 1.14 MIU/L (ref 0.3–5)
WBC # BLD: 7.2 K/UL (ref 3.5–11)
WBC # BLD: ABNORMAL 10*3/UL

## 2018-03-01 PROCEDURE — 80048 BASIC METABOLIC PNL TOTAL CA: CPT

## 2018-03-01 PROCEDURE — 85610 PROTHROMBIN TIME: CPT

## 2018-03-01 PROCEDURE — 71045 X-RAY EXAM CHEST 1 VIEW: CPT

## 2018-03-01 PROCEDURE — 99285 EMERGENCY DEPT VISIT HI MDM: CPT

## 2018-03-01 PROCEDURE — 84484 ASSAY OF TROPONIN QUANT: CPT

## 2018-03-01 PROCEDURE — 80306 DRUG TEST PRSMV INSTRMNT: CPT

## 2018-03-01 PROCEDURE — 36415 COLL VENOUS BLD VENIPUNCTURE: CPT

## 2018-03-01 PROCEDURE — 85730 THROMBOPLASTIN TIME PARTIAL: CPT

## 2018-03-01 PROCEDURE — 84443 ASSAY THYROID STIM HORMONE: CPT

## 2018-03-01 PROCEDURE — 84439 ASSAY OF FREE THYROXINE: CPT

## 2018-03-01 PROCEDURE — 85025 COMPLETE CBC W/AUTO DIFF WBC: CPT

## 2018-03-01 PROCEDURE — 93005 ELECTROCARDIOGRAM TRACING: CPT

## 2018-03-01 ASSESSMENT — PAIN SCALES - GENERAL
PAINLEVEL_OUTOF10: 2
PAINLEVEL_OUTOF10: 8

## 2018-03-01 ASSESSMENT — PAIN DESCRIPTION - LOCATION: LOCATION: CHEST

## 2018-03-01 ASSESSMENT — PAIN DESCRIPTION - DESCRIPTORS: DESCRIPTORS: ACHING

## 2018-03-01 ASSESSMENT — PAIN DESCRIPTION - ONSET: ONSET: ON-GOING

## 2018-03-01 ASSESSMENT — PAIN DESCRIPTION - FREQUENCY: FREQUENCY: CONTINUOUS

## 2018-03-01 ASSESSMENT — PAIN DESCRIPTION - ORIENTATION: ORIENTATION: MID

## 2018-03-01 ASSESSMENT — PAIN DESCRIPTION - PAIN TYPE: TYPE: CHRONIC PAIN

## 2018-03-01 ASSESSMENT — PAIN DESCRIPTION - PROGRESSION: CLINICAL_PROGRESSION: NOT CHANGED

## 2018-03-01 NOTE — ED PROVIDER NOTES
Associated Diagnoses: Anxiety and depression      hydrOXYzine (VISTARIL) 25 MG capsule Take 1 capsule by mouth 3 times daily as needed for Anxiety  Qty: 30 capsule, Refills: 0    Associated Diagnoses: Anxiety      mometasone-formoterol (DULERA) 200-5 MCG/ACT inhaler Inhale 2 puffs into the lungs every 12 hours RINSE MOUTH AFTER USE  Qty: 1 Inhaler, Refills: 1    Associated Diagnoses: Mild persistent asthma with acute exacerbation      pantoprazole (PROTONIX) 40 MG tablet Take 1 tablet by mouth every morning (before breakfast)  Qty: 30 tablet, Refills: 3    Associated Diagnoses: Gastroesophageal reflux disease without esophagitis      ondansetron (ZOFRAN ODT) 4 MG disintegrating tablet Take 1 tablet by mouth every 8 hours as needed for Nausea  Qty: 12 tablet, Refills: 0      fexofenadine (ALLEGRA ALLERGY) 180 MG tablet Take 1 tablet by mouth daily  Qty: 30 tablet, Refills: 10    Associated Diagnoses: Seasonal allergic rhinitis, unspecified allergic rhinitis trigger      polyethylene glycol (GLYCOLAX) powder Take 17 g by mouth daily      albuterol sulfate HFA (VENTOLIN HFA) 108 (90 BASE) MCG/ACT inhaler Inhale 2 puffs into the lungs every 6 hours as needed for Wheezing  Qty: 1 Inhaler, Refills: 3    Associated Diagnoses: Wheezing      aspirin 81 MG EC tablet Take 1 tablet by mouth daily  Qty: 30 tablet, Refills: 3             ALLERGIES     has No Known Allergies. FAMILY HISTORY     indicated that the status of her father is unknown.      family history includes Coronary Art Dis in her father. SOCIAL HISTORY      reports that she is a non-smoker but has been exposed to tobacco smoke. She has never used smokeless tobacco. She reports that she does not drink alcohol or use drugs. PHYSICAL EXAM     INITIAL VITALS:  weight is 150 lb (68 kg). Her tympanic temperature is 99.1 °F (37.3 °C). Her blood pressure is 137/86 and her pulse is 73. Her respiration is 12 and oxygen saturation is 98%.    Physical Exam 5.00 mIU/L   Thyroxine, Free   Result Value Ref Range    Thyroxine, Free 1.13 0.93 - 1.70 ng/dL   Urine Drug Screen   Result Value Ref Range    Amphetamine Screen, Ur NEGATIVE NEG    Barbiturate Screen, Ur NEGATIVE NEG    Benzodiazepine Screen, Urine NEGATIVE NEG    Cocaine Metabolite, Urine NEGATIVE NEG    Methadone Screen, Urine NEGATIVE NEG    Opiates, Urine NEGATIVE NEG    Phencyclidine, Urine NEGATIVE NEG    Propoxyphene, Urine NEGATIVE NEG    Cannabinoid Scrn, Ur NEGATIVE NEG    Oxycodone Screen, Ur NEGATIVE NEG    Methamphetamine, Urine NEGATIVE NEG    Tricyclic Antidepressants, Ur NEGATIVE NEG    MDMA URINE NOT REPORTED NEG    Buprenorphine Urine NEGATIVE NEG    Test Information       The following threshold concentrations are established for the drug assays:   EKG 12 Lead   Result Value Ref Range    Ventricular Rate 71 BPM    Atrial Rate 71 BPM    P-R Interval 160 ms    QRS Duration 78 ms    Q-T Interval 362 ms    QTc Calculation (Bazett) 393 ms    P Axis 47 degrees    R Axis 30 degrees    T Axis 36 degrees   EKG 12 Lead   Result Value Ref Range    Ventricular Rate 66 BPM    Atrial Rate 66 BPM    P-R Interval 164 ms    QRS Duration 78 ms    Q-T Interval 386 ms    QTc Calculation (Bazett) 404 ms    P Axis 41 degrees    R Axis 20 degrees    T Axis 28 degrees       EMERGENCY DEPARTMENT COURSE:     The patient was given the following medications:  No orders of the defined types were placed in this encounter. Vitals:    Vitals:    03/01/18 1426 03/01/18 1457 03/01/18 1527 03/01/18 1556   BP: 133/87 124/87 (!) 136/96 137/86   Pulse: 74 71 67 73   Resp: 12 12 12 12   Temp:       TempSrc:       SpO2:       Weight:         -------------------------  BP: 137/86, Temp: 99.1 °F (37.3 °C), Pulse: 73, Resp: 12      Re-evaluation Notes  To our EKG as interpreted by myself as showing a normal sinus rhythm without any acute ST segment changes. Rate axis and intervals are all normal.  Repeat troponin is negative.

## 2018-03-01 NOTE — TELEPHONE ENCOUNTER
Patient walked in with complaints of chest pain off and on for a while. Jorge Reddy talked to patient and thought it would be best if she went to ER. Called and gave report to Kareem correia and Jorge Reddy took patient to ER in wheelchair.

## 2018-03-02 LAB
EKG ATRIAL RATE: 66 BPM
EKG ATRIAL RATE: 71 BPM
EKG P AXIS: 41 DEGREES
EKG P AXIS: 47 DEGREES
EKG P-R INTERVAL: 160 MS
EKG P-R INTERVAL: 164 MS
EKG Q-T INTERVAL: 362 MS
EKG Q-T INTERVAL: 386 MS
EKG QRS DURATION: 78 MS
EKG QRS DURATION: 78 MS
EKG QTC CALCULATION (BAZETT): 393 MS
EKG QTC CALCULATION (BAZETT): 404 MS
EKG R AXIS: 20 DEGREES
EKG R AXIS: 30 DEGREES
EKG T AXIS: 28 DEGREES
EKG T AXIS: 36 DEGREES
EKG VENTRICULAR RATE: 66 BPM
EKG VENTRICULAR RATE: 71 BPM

## 2018-03-30 ENCOUNTER — TELEPHONE (OUTPATIENT)
Dept: FAMILY MEDICINE CLINIC | Age: 48
End: 2018-03-30

## 2018-03-30 DIAGNOSIS — F41.9 ANXIETY: Primary | ICD-10-CM

## 2018-04-02 ENCOUNTER — OFFICE VISIT (OUTPATIENT)
Dept: CARDIOLOGY | Age: 48
End: 2018-04-02
Payer: MEDICARE

## 2018-04-02 VITALS
WEIGHT: 146 LBS | HEART RATE: 81 BPM | HEIGHT: 59 IN | DIASTOLIC BLOOD PRESSURE: 102 MMHG | SYSTOLIC BLOOD PRESSURE: 126 MMHG | BODY MASS INDEX: 29.43 KG/M2

## 2018-04-02 DIAGNOSIS — I10 ESSENTIAL HYPERTENSION: Primary | ICD-10-CM

## 2018-04-02 DIAGNOSIS — R07.89 MUSCULOSKELETAL CHEST PAIN: ICD-10-CM

## 2018-04-02 PROCEDURE — 93000 ELECTROCARDIOGRAM COMPLETE: CPT | Performed by: INTERNAL MEDICINE

## 2018-04-02 PROCEDURE — 99203 OFFICE O/P NEW LOW 30 MIN: CPT | Performed by: INTERNAL MEDICINE

## 2018-04-02 RX ORDER — LISINOPRIL 5 MG/1
5 TABLET ORAL DAILY
Qty: 30 TABLET | Refills: 6 | Status: SHIPPED | OUTPATIENT
Start: 2018-04-02 | End: 2022-06-10

## 2018-04-11 ENCOUNTER — OFFICE VISIT (OUTPATIENT)
Dept: PRIMARY CARE CLINIC | Age: 48
End: 2018-04-11
Payer: MEDICARE

## 2018-04-11 VITALS
HEIGHT: 59 IN | OXYGEN SATURATION: 98 % | BODY MASS INDEX: 29.84 KG/M2 | HEART RATE: 103 BPM | SYSTOLIC BLOOD PRESSURE: 136 MMHG | DIASTOLIC BLOOD PRESSURE: 80 MMHG | RESPIRATION RATE: 18 BRPM | WEIGHT: 148 LBS | TEMPERATURE: 99.3 F

## 2018-04-11 DIAGNOSIS — K04.7 TOOTH INFECTION: ICD-10-CM

## 2018-04-11 DIAGNOSIS — K02.9 PAIN DUE TO DENTAL CARIES: Primary | ICD-10-CM

## 2018-04-11 PROCEDURE — 99213 OFFICE O/P EST LOW 20 MIN: CPT | Performed by: NURSE PRACTITIONER

## 2018-04-11 PROCEDURE — G8417 CALC BMI ABV UP PARAM F/U: HCPCS | Performed by: NURSE PRACTITIONER

## 2018-04-11 PROCEDURE — 1036F TOBACCO NON-USER: CPT | Performed by: NURSE PRACTITIONER

## 2018-04-11 PROCEDURE — G8427 DOCREV CUR MEDS BY ELIG CLIN: HCPCS | Performed by: NURSE PRACTITIONER

## 2018-04-11 RX ORDER — AMOXICILLIN 500 MG/1
500 CAPSULE ORAL 3 TIMES DAILY
Qty: 30 CAPSULE | Refills: 0 | Status: SHIPPED | OUTPATIENT
Start: 2018-04-11 | End: 2022-06-10

## 2018-04-11 ASSESSMENT — ENCOUNTER SYMPTOMS
ALLERGIC/IMMUNOLOGIC NEGATIVE: 1
SHORTNESS OF BREATH: 0
ABDOMINAL PAIN: 0
GASTROINTESTINAL NEGATIVE: 1
EYES NEGATIVE: 1
CONSTIPATION: 0
COUGH: 0
SINUS PRESSURE: 0
DIARRHEA: 0
NAUSEA: 0
RESPIRATORY NEGATIVE: 1
TROUBLE SWALLOWING: 0
VOMITING: 0
CHEST TIGHTNESS: 0
FACIAL SWELLING: 1

## 2018-04-12 DIAGNOSIS — F41.9 ANXIETY: ICD-10-CM

## 2018-04-12 DIAGNOSIS — J30.2 SEASONAL ALLERGIC RHINITIS, UNSPECIFIED CHRONICITY, UNSPECIFIED TRIGGER: Primary | ICD-10-CM

## 2018-04-12 DIAGNOSIS — K21.9 GASTROESOPHAGEAL REFLUX DISEASE WITHOUT ESOPHAGITIS: ICD-10-CM

## 2018-04-12 RX ORDER — FEXOFENADINE HCL 180 MG/1
180 TABLET ORAL DAILY
Qty: 30 TABLET | Refills: 10 | Status: SHIPPED | OUTPATIENT
Start: 2018-04-12 | End: 2018-04-17 | Stop reason: SDUPTHER

## 2018-04-12 RX ORDER — HYDROXYZINE PAMOATE 25 MG/1
25 CAPSULE ORAL 3 TIMES DAILY PRN
Qty: 30 CAPSULE | Refills: 0 | Status: SHIPPED | OUTPATIENT
Start: 2018-04-12 | End: 2018-04-17 | Stop reason: SDUPTHER

## 2018-04-12 RX ORDER — PANTOPRAZOLE SODIUM 40 MG/1
40 TABLET, DELAYED RELEASE ORAL
Qty: 30 TABLET | Refills: 3 | Status: SHIPPED | OUTPATIENT
Start: 2018-04-12 | End: 2018-04-17 | Stop reason: SDUPTHER

## 2018-04-17 DIAGNOSIS — F41.9 ANXIETY: ICD-10-CM

## 2018-04-17 DIAGNOSIS — J30.2 SEASONAL ALLERGIC RHINITIS, UNSPECIFIED CHRONICITY, UNSPECIFIED TRIGGER: ICD-10-CM

## 2018-04-17 DIAGNOSIS — K21.9 GASTROESOPHAGEAL REFLUX DISEASE WITHOUT ESOPHAGITIS: ICD-10-CM

## 2018-04-17 RX ORDER — HYDROXYZINE PAMOATE 25 MG/1
25 CAPSULE ORAL 3 TIMES DAILY PRN
Qty: 30 CAPSULE | Refills: 0 | Status: SHIPPED | OUTPATIENT
Start: 2018-04-17 | End: 2022-06-10 | Stop reason: SDUPTHER

## 2018-04-17 RX ORDER — FEXOFENADINE HCL 180 MG/1
180 TABLET ORAL DAILY
Qty: 30 TABLET | Refills: 3 | Status: SHIPPED | OUTPATIENT
Start: 2018-04-17 | End: 2022-06-10

## 2018-04-17 RX ORDER — PANTOPRAZOLE SODIUM 40 MG/1
40 TABLET, DELAYED RELEASE ORAL
Qty: 30 TABLET | Refills: 3 | Status: SHIPPED | OUTPATIENT
Start: 2018-04-17

## 2018-05-01 ENCOUNTER — TELEPHONE (OUTPATIENT)
Dept: CARDIOLOGY | Age: 48
End: 2018-05-01

## 2018-05-01 NOTE — TELEPHONE ENCOUNTER
Pt calling to ask if she could increase her lisinopril due to her BP readings still running high. Pt had it take by EMS today and it was     184/124  115/109  115/99    Pt states she was instructed by EMS to take another lisinopril and then another for a total of 3 5 mg tabs. Pt BP dropped to the 115/99 reading. Pt is asking if she can continue to take a higher dose.     [phone]

## 2019-01-11 ENCOUNTER — HOSPITAL ENCOUNTER (OUTPATIENT)
Age: 49
Setting detail: SPECIMEN
Discharge: HOME | End: 2019-01-11
Payer: MEDICARE

## 2019-01-11 LAB
DIRECT EXAM: NORMAL
Lab: NORMAL
SPECIMEN DESCRIPTION: NORMAL
STATUS: NORMAL

## 2019-01-14 LAB
C TRACH DNA GENITAL QL NAA+PROBE: NEGATIVE
N. GONORRHOEAE DNA: NEGATIVE

## 2019-09-06 ENCOUNTER — HOSPITAL ENCOUNTER (OUTPATIENT)
Age: 49
Setting detail: SPECIMEN
Discharge: HOME | End: 2019-09-06

## 2019-09-06 LAB
ALBUMIN SERPL-MCNC: 4.4 G/DL (ref 3.5–5.2)
ALBUMIN/GLOBULIN RATIO: 1.3 (ref 1–2.5)
ALP BLD-CCNC: 98 U/L (ref 35–104)
ALT SERPL-CCNC: 12 U/L (ref 5–33)
ANION GAP SERPL CALCULATED.3IONS-SCNC: 13 MMOL/L (ref 9–17)
AST SERPL-CCNC: 18 U/L
BILIRUB SERPL-MCNC: 0.46 MG/DL (ref 0.3–1.2)
BUN BLDV-MCNC: 9 MG/DL (ref 6–20)
BUN/CREAT BLD: ABNORMAL (ref 9–20)
CALCIUM SERPL-MCNC: 9.3 MG/DL (ref 8.6–10.4)
CHLORIDE BLD-SCNC: 104 MMOL/L (ref 98–107)
CHOLESTEROL/HDL RATIO: 4.4
CHOLESTEROL: 188 MG/DL
CO2: 23 MMOL/L (ref 20–31)
CREAT SERPL-MCNC: 0.67 MG/DL (ref 0.5–0.9)
GFR AFRICAN AMERICAN: >60 ML/MIN
GFR NON-AFRICAN AMERICAN: >60 ML/MIN
GFR SERPL CREATININE-BSD FRML MDRD: ABNORMAL ML/MIN/{1.73_M2}
GFR SERPL CREATININE-BSD FRML MDRD: ABNORMAL ML/MIN/{1.73_M2}
GLUCOSE BLD-MCNC: 89 MG/DL (ref 70–99)
HCT VFR BLD CALC: 50.3 % (ref 36.3–47.1)
HDLC SERPL-MCNC: 43 MG/DL
HEMOGLOBIN: 15.5 G/DL (ref 11.9–15.1)
LDL CHOLESTEROL: 120 MG/DL (ref 0–130)
MCH RBC QN AUTO: 26.9 PG (ref 25.2–33.5)
MCHC RBC AUTO-ENTMCNC: 30.8 G/DL (ref 28.4–34.8)
MCV RBC AUTO: 87.2 FL (ref 82.6–102.9)
NRBC AUTOMATED: 0 PER 100 WBC
PDW BLD-RTO: 13.4 % (ref 11.8–14.4)
PLATELET # BLD: 389 K/UL (ref 138–453)
PMV BLD AUTO: 10.9 FL (ref 8.1–13.5)
POTASSIUM SERPL-SCNC: 5.4 MMOL/L (ref 3.7–5.3)
RBC # BLD: 5.77 M/UL (ref 3.95–5.11)
SODIUM BLD-SCNC: 140 MMOL/L (ref 135–144)
THYROXINE, FREE: 1.26 NG/DL (ref 0.93–1.7)
TOTAL PROTEIN: 7.8 G/DL (ref 6.4–8.3)
TRIGL SERPL-MCNC: 127 MG/DL
TSH SERPL DL<=0.05 MIU/L-ACNC: 2.56 MIU/L (ref 0.3–5)
VLDLC SERPL CALC-MCNC: NORMAL MG/DL (ref 1–30)
WBC # BLD: 7.3 K/UL (ref 3.5–11.3)

## 2022-02-16 ENCOUNTER — TELEPHONE (OUTPATIENT)
Dept: FAMILY MEDICINE CLINIC | Age: 52
End: 2022-02-16

## 2022-02-16 NOTE — TELEPHONE ENCOUNTER
----- Message from Wili sent at 2/15/2022  5:45 PM EST -----  Subject: Appointment Request    Reason for Call: Routine Physical Exam with PAP    QUESTIONS  Type of Appointment? New Patient/New to Provider  Reason for appointment request? No appointments available during search  Additional Information for Provider? new pt, establish care. would like to   have an afternoon appt, pt would prefer a female provider. pt says she was   dismissed from her old providers office due to discrimination. she needs   an appt for physical and PAP smear.   ---------------------------------------------------------------------------  --------------  CALL BACK INFO  What is the best way for the office to contact you? OK to leave message on   voicemail  Preferred Call Back Phone Number? [phone]  ---------------------------------------------------------------------------  --------------  SCRIPT ANSWERS  Relationship to Patient? Self  Specialty Confirmation? Primary Care  (If the patient has Medicare as their primary insurance coverage ask this   question) Are you requesting a Medicare Annual Wellness Visit? No  (Is the patient requesting a pap smear with their physical exam?)? Yes  (Is the patient requesting their annual physical and does not need PAP or   AWV per above?)? Yes   Have you been diagnosed with, awaiting test results for, or told that you   are suspected of having COVID-19 (Coronavirus)? (If patient has tested   negative or was tested as a requirement for work, school, or travel and   not based on symptoms, answer no)? No  Within the past 10 days have you developed any of the following symptoms   (answer no if symptoms have been present longer than 10 days or began   more than 10 days ago)?  Fever or Chills, Cough, Shortness of breath or   difficulty breathing, Loss of taste or smell, Sore throat, Nasal   congestion, Sneezing or runny nose, Fatigue or generalized body aches   (answer no if pain is specific to a body part e.g. back pain), Diarrhea,   Headache? No  Have you had close contact with someone with COVID-19 in the last 7 days? No  (Service Expert  click yes below to proceed with [employer] As Usual   Scheduling)?  Yes

## 2022-06-10 ENCOUNTER — OFFICE VISIT (OUTPATIENT)
Dept: FAMILY MEDICINE CLINIC | Age: 52
End: 2022-06-10
Payer: MEDICAID

## 2022-06-10 VITALS
OXYGEN SATURATION: 98 % | DIASTOLIC BLOOD PRESSURE: 80 MMHG | HEIGHT: 59 IN | WEIGHT: 133 LBS | HEART RATE: 68 BPM | SYSTOLIC BLOOD PRESSURE: 132 MMHG | BODY MASS INDEX: 26.81 KG/M2 | TEMPERATURE: 98.7 F

## 2022-06-10 DIAGNOSIS — J45.41 MODERATE PERSISTENT ASTHMA WITH ACUTE EXACERBATION: Primary | ICD-10-CM

## 2022-06-10 DIAGNOSIS — Z13.220 SCREENING CHOLESTEROL LEVEL: ICD-10-CM

## 2022-06-10 DIAGNOSIS — Z23 NEED FOR SHINGLES VACCINE: ICD-10-CM

## 2022-06-10 DIAGNOSIS — Z13.1 SCREENING FOR DIABETES MELLITUS: ICD-10-CM

## 2022-06-10 DIAGNOSIS — Z12.31 VISIT FOR SCREENING MAMMOGRAM: ICD-10-CM

## 2022-06-10 DIAGNOSIS — I10 ESSENTIAL HYPERTENSION: ICD-10-CM

## 2022-06-10 DIAGNOSIS — Z11.59 ENCOUNTER FOR HEPATITIS C SCREENING TEST FOR LOW RISK PATIENT: ICD-10-CM

## 2022-06-10 DIAGNOSIS — Z12.11 COLON CANCER SCREENING: ICD-10-CM

## 2022-06-10 DIAGNOSIS — F41.9 ANXIETY: ICD-10-CM

## 2022-06-10 PROCEDURE — 99203 OFFICE O/P NEW LOW 30 MIN: CPT | Performed by: FAMILY MEDICINE

## 2022-06-10 PROCEDURE — 99204 OFFICE O/P NEW MOD 45 MIN: CPT | Performed by: FAMILY MEDICINE

## 2022-06-10 RX ORDER — ALBUTEROL SULFATE 90 UG/1
2 AEROSOL, METERED RESPIRATORY (INHALATION) EVERY 6 HOURS PRN
Qty: 1 EACH | Refills: 3 | Status: SHIPPED | OUTPATIENT
Start: 2022-06-10

## 2022-06-10 RX ORDER — CETIRIZINE HYDROCHLORIDE 10 MG/1
CAPSULE, LIQUID FILLED ORAL
COMMUNITY
Start: 2019-04-01

## 2022-06-10 RX ORDER — LOSARTAN POTASSIUM 25 MG/1
TABLET ORAL
Qty: 90 TABLET | Refills: 1 | Status: SHIPPED | OUTPATIENT
Start: 2022-06-10

## 2022-06-10 RX ORDER — LOSARTAN POTASSIUM 25 MG/1
TABLET ORAL
COMMUNITY
Start: 2022-04-28 | End: 2022-06-10 | Stop reason: SDUPTHER

## 2022-06-10 RX ORDER — HYDROXYZINE PAMOATE 25 MG/1
25 CAPSULE ORAL 3 TIMES DAILY PRN
Qty: 90 CAPSULE | Refills: 1 | Status: SHIPPED | OUTPATIENT
Start: 2022-06-10

## 2022-06-10 SDOH — ECONOMIC STABILITY: FOOD INSECURITY: WITHIN THE PAST 12 MONTHS, THE FOOD YOU BOUGHT JUST DIDN'T LAST AND YOU DIDN'T HAVE MONEY TO GET MORE.: PATIENT DECLINED

## 2022-06-10 SDOH — ECONOMIC STABILITY: FOOD INSECURITY: WITHIN THE PAST 12 MONTHS, YOU WORRIED THAT YOUR FOOD WOULD RUN OUT BEFORE YOU GOT MONEY TO BUY MORE.: PATIENT DECLINED

## 2022-06-10 ASSESSMENT — PATIENT HEALTH QUESTIONNAIRE - PHQ9
SUM OF ALL RESPONSES TO PHQ QUESTIONS 1-9: 0
1. LITTLE INTEREST OR PLEASURE IN DOING THINGS: 0
SUM OF ALL RESPONSES TO PHQ QUESTIONS 1-9: 0
2. FEELING DOWN, DEPRESSED OR HOPELESS: 0
SUM OF ALL RESPONSES TO PHQ QUESTIONS 1-9: 0
SUM OF ALL RESPONSES TO PHQ9 QUESTIONS 1 & 2: 0
SUM OF ALL RESPONSES TO PHQ QUESTIONS 1-9: 0

## 2022-06-10 ASSESSMENT — SOCIAL DETERMINANTS OF HEALTH (SDOH): HOW HARD IS IT FOR YOU TO PAY FOR THE VERY BASICS LIKE FOOD, HOUSING, MEDICAL CARE, AND HEATING?: PATIENT DECLINED

## 2022-06-10 ASSESSMENT — ENCOUNTER SYMPTOMS
DIARRHEA: 0
CONSTIPATION: 0
WHEEZING: 0
CHEST TIGHTNESS: 0
ABDOMINAL PAIN: 0
SHORTNESS OF BREATH: 0
COUGH: 0

## 2022-06-10 NOTE — PROGRESS NOTES
[facility]  [address] [address]  Dept: [phone]  Dept Fax: [phone]  Loc: [phone]    Andrés Pradhan is a 45 y.o. female who presents today for her medical conditions/complaints as notedbelow. Andrés Pradhan is c/o of   Chief Complaint   Patient presents with    Established New Doctor     yair Sapp, [address] Other     increased appetite due to hormones    Anxiety       HPI:     HPI Here today to establish care. She would like to discuss her anxiety, htn    She has been struggling with issues with her hormones. She is very hungry in the middle of the night. She has been trying to just drink water or eat an apple at that time. She has a nutritionist and she has a . Her  encouraged her to lose 20 pounds. She had an issues with some anorexia during 2020 but she has recovered. She has never had any issues with her thyroid and no issues with diabetes. She is taking a protein supplement. She has been sleeping pretty well. She takes hydroxyzine regularly and sometimes uses it at night to help her fall asleep. HTN: stable; No issues with chest pain or shortness of breath. She gets some headaches from her losartan. She has found that if she eats apples to help her headaches. No issues with leg swelling. She does not have any issues with lightheadedness or dizziness upon standing. Her asthma has been well controlled with the dulera and albuterol. She rarely needs the albuterol but when she takes it, it does work.     Past Medical History:   Diagnosis Date    Anxiety     Asthma     seasonal    HTN (hypertension)           Social History     Tobacco Use    Smoking status: Passive Smoke Exposure - Never Smoker    Smokeless tobacco: Never Used   Substance Use Topics    Alcohol use: No     Current Outpatient Medications   Medication Sig Dispense 6/10/2022 at 3:44 PM

## 2022-06-22 ENCOUNTER — IMMUNIZATION (OUTPATIENT)
Dept: LAB | Age: 52
End: 2022-06-22
Payer: MEDICAID

## 2022-06-22 PROCEDURE — 91306 COVID-19, MODERNA BOOSTER, (AGE 18 YRS+), IM, 50MCG/0.25ML: CPT | Performed by: INTERNAL MEDICINE

## 2022-08-04 ENCOUNTER — TELEPHONE (OUTPATIENT)
Dept: FAMILY MEDICINE CLINIC | Age: 52
End: 2022-08-04

## 2022-08-04 NOTE — TELEPHONE ENCOUNTER
Pt would like to know if \"TEA BURN\" is safe to take with her current medications for weight loss. Pt wanted to wait for Dr Sparkle Stone input before starting it. Agreed with pt, never start a supplement without medical \"clearance\" when on blood pressure medications. Posted ingredients on site: L-carnitine, L-theanine, caffeine, green tea extraction, chlorogenic acid.

## 2022-11-01 ENCOUNTER — TELEMEDICINE (OUTPATIENT)
Dept: FAMILY MEDICINE CLINIC | Age: 52
End: 2022-11-01
Payer: MEDICAID

## 2022-11-01 DIAGNOSIS — U07.1 COVID-19: Primary | ICD-10-CM

## 2022-11-01 PROCEDURE — 99213 OFFICE O/P EST LOW 20 MIN: CPT | Performed by: NURSE PRACTITIONER

## 2022-11-01 PROCEDURE — 99212 OFFICE O/P EST SF 10 MIN: CPT

## 2022-11-01 ASSESSMENT — ENCOUNTER SYMPTOMS
SHORTNESS OF BREATH: 0
SINUS PRESSURE: 1
WHEEZING: 0
SORE THROAT: 1
VOMITING: 0
NAUSEA: 1
COUGH: 1
RHINORRHEA: 1

## 2022-11-01 NOTE — LETTER
Pankaj Molina A department of [place] [address]  Phone: [phone]  Fax: [phone]    CARY Pringle CNP        November 4, 2022     Patient: Yumiko Rivero   YOB: 1970   Date of Visit: 11/1/2022       To Whom it May Concern:    Yumiko Rivero was seen in my clinic on 11/01/2022. She may return to work on 11/08/2022. Please excuse Mc Brown from work 10/31/2022 through 11/07/2022 due to illness. If you have any questions or concerns, please don't hesitate to call.     Sincerely,         CARY Pringle CNP

## 2022-11-01 NOTE — PROGRESS NOTES
Maritza Mccarthy (:  1970) is a Established patient, here for evaluation of the following:    Assessment & Plan   Below is the assessment and plan developed based on review of pertinent history, physical exam, labs, studies, and medications. 1. COVID-19  Supportive care  Push fluids  Discussed paxlovid. Pt would like to wait and see how she is on Thursday. Return if symptoms do not improve or worsen   returN PRN   No follow-ups on file. Subjective   Positive For Covid-19  This is a new problem. The current episode started in the past 7 days (). The problem occurs daily. The problem has been unchanged. Associated symptoms include congestion, coughing, fatigue, headaches, myalgias, nausea and a sore throat. Pertinent negatives include no chest pain, fever or vomiting. The symptoms are aggravated by swallowing, drinking and eating. She has tried nothing for the symptoms. The treatment provided no relief.      Past Medical History:   Diagnosis Date    Anxiety     Asthma     seasonal    HTN (hypertension)        Past Surgical History:   Procedure Laterality Date     SECTION  2003     SECTION  2005     SECTION  2010    TUBAL LIGATION Bilateral 2010       Social History     Socioeconomic History    Marital status:    Tobacco Use    Smoking status: Passive Smoke Exposure - Never Smoker    Smokeless tobacco: Never   Substance and Sexual Activity    Alcohol use: No    Drug use: No     Social Determinants of Health     Financial Resource Strain: Unknown    Difficulty of Paying Living Expenses: Patient refused   Food Insecurity: Unknown    Worried About Running Out of Food in the Last Year: Patient refused    Ran Out of Food in the Last Year: Patient refused       Family History   Problem Relation Age of Onset    Coronary Art Dis Father        No Known Allergies    Current Outpatient Medications   Medication Sig Dispense Refill    Cetirizine HCl (ZYRTEC ALLERGY) 10 MG CAPS       albuterol sulfate HFA (VENTOLIN HFA) 108 (90 Base) MCG/ACT inhaler Inhale 2 puffs into the lungs every 6 hours as needed for Wheezing 1 each 3    hydrOXYzine pamoate (VISTARIL) 25 MG capsule Take 1 capsule by mouth 3 times daily as needed for Anxiety 90 capsule 1    mometasone-formoterol (DULERA) 200-5 MCG/ACT inhaler Inhale 1 puff into the lungs 2 times daily 13 g 3    zoster recombinant adjuvanted vaccine (SHINGRIX) 50 MCG/0.5ML SUSR injection 1 dose now and repeat in 2-6 months 0.5 mL 1    losartan (COZAAR) 25 MG tablet TAKE 1 TABLET BY MOUTH IN THE MORNING 90 tablet 1    pantoprazole (PROTONIX) 40 MG tablet Take 1 tablet by mouth every morning (before breakfast) 30 tablet 3    polyethylene glycol (GLYCOLAX) powder Take 17 g by mouth daily      aspirin 81 MG EC tablet Take 1 tablet by mouth daily 30 tablet 3     No current facility-administered medications for this visit. Review of Systems   Constitutional:  Positive for fatigue. Negative for activity change, appetite change and fever. HENT:  Positive for congestion, ear discharge, rhinorrhea, sinus pressure and sore throat. Respiratory:  Positive for cough. Negative for shortness of breath and wheezing. Cardiovascular:  Negative for chest pain and palpitations. Gastrointestinal:  Positive for nausea. Negative for vomiting. Musculoskeletal:  Positive for myalgias. Skin: Negative. Neurological:  Positive for headaches.         Objective   Patient-Reported Vitals  Patient-Reported Systolic (Top): 157 mmHg  Patient-Reported Diastolic (Bottom): 75 mmHg  Patient-Reported Temperature: 97.5  Patient-Reported Weight: 127lb  Patient-Reported Height: 5ft1       Physical Exam  [INSTRUCTIONS:  \"[x]\" Indicates a positive item  \"[]\" Indicates a negative item  -- DELETE ALL ITEMS NOT EXAMINED]    Constitutional: [x] Appears well-developed and well-nourished [x] No apparent distress      [] Abnormal -     Mental status: [x] Alert and awake  [x] Oriented to person/place/time [x] Able to follow commands    [] Abnormal -     Eyes:   EOM    [x]  Normal    [] Abnormal -   Sclera  [x]  Normal    [] Abnormal -          Discharge [x]  None visible   [] Abnormal -     HENT: [x] Normocephalic, atraumatic  [x] Abnormal - sounds congested  [x] Mouth/Throat: Mucous membranes are moist    External Ears [x] Normal  [] Abnormal -    Neck: [x] No visualized mass [] Abnormal -     Pulmonary/Chest: [x] Respiratory effort normal   [x] No visualized signs of difficulty breathing or respiratory distress        [] Abnormal -      Musculoskeletal:   [x] Normal gait with no signs of ataxia         [x] Normal range of motion of neck        [] Abnormal -     Neurological:        [x] No Facial Asymmetry (Cranial nerve 7 motor function) (limited exam due to video visit)          [x] No gaze palsy        [] Abnormal -          Skin:        [x] No significant exanthematous lesions or discoloration noted on facial skin         [] Abnormal -            Psychiatric:       [x] Normal Affect [] Abnormal -        [x] No Hallucinations    Other pertinent observable physical exam findings:-             Ioana Pickard, was evaluated through a synchronous (real-time) audio-video encounter. The patient (or guardian if applicable) is aware that this is a billable service, which includes applicable co-pays. This Virtual Visit was conducted with patient's (and/or legal guardian's) consent. The visit was conducted pursuant to the emergency declaration under the [facility] authority and the [employer] General Act. Patient identification was verified, and a caregiver was present when appropriate. The patient was located at Home: [address] [place] [address] [address].    Provider was located at Home ([facility]): [address], APRN - CNP

## 2022-11-14 ENCOUNTER — E-VISIT (OUTPATIENT)
Dept: FAMILY MEDICINE CLINIC | Age: 52
End: 2022-11-14
Payer: MEDICAID

## 2022-11-14 PROCEDURE — 99421 OL DIG E/M SVC 5-10 MIN: CPT | Performed by: NURSE PRACTITIONER

## 2022-11-14 ASSESSMENT — LIFESTYLE VARIABLES: SMOKING_STATUS: NO, I HAVE NEVER SMOKED

## 2022-11-14 NOTE — LETTER
Pankaj Molina A department of [facility]  Phone: [phone]  Fax: [phone]    Pam Tristan LPN        November 15, 2022     Patient: Shanna Delgado   YOB: 1970   Date of Visit: 11/14/2022       To Whom it May Concern:    Shanna Delgado was seen in my clinic on 11/15/2022. Marsha Flores missed work November 10, November 11, and November 12, 2022. Marsha Flores also has missed November 15, 2022 due to illness. Please excuse these dates due to illness. If you have any questions or concerns, please don't hesitate to call.     Sincerely,         Pam Tristan LPN

## 2022-11-14 NOTE — LETTER
Pankaj Molina A department of [facility]  Phone: [phone]  Fax: [phone]    Teja Olson LPN        November 15, 2022     Patient: Guillermo Powell   YOB: 1970   Date of Visit: 11/14/2022       To Whom it May Concern:    Guillermo Powell was seen in my clinic on 11/01/2022. She may return to work on 11/01/2022 through 11/05/2022 and may return to work November 8, 2022. If you have any questions or concerns, please don't hesitate to call.     Sincerely,         Teja Olson LPN

## 2022-11-14 NOTE — LETTER
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 These restrictions will be in effect until November 30, 2022 and Sav Redd will need to be reevaluated at that time. If you have any questions or concerns, please don't hesitate to call.     Sincerely,        Gladys Ayala LPN

## 2022-11-15 NOTE — PROGRESS NOTES
Reviewed E-visit. Will provide patient with work restrictions for 2 weeks then will require reevaluation. 5-10 minutes were spent on the digital evaluation and management of this patient.

## 2022-12-12 ENCOUNTER — OFFICE VISIT (OUTPATIENT)
Dept: FAMILY MEDICINE CLINIC | Age: 52
End: 2022-12-12
Payer: COMMERCIAL

## 2022-12-12 VITALS
WEIGHT: 128 LBS | SYSTOLIC BLOOD PRESSURE: 120 MMHG | TEMPERATURE: 98.3 F | OXYGEN SATURATION: 99 % | HEART RATE: 83 BPM | HEIGHT: 59 IN | DIASTOLIC BLOOD PRESSURE: 80 MMHG | BODY MASS INDEX: 25.8 KG/M2

## 2022-12-12 DIAGNOSIS — I10 ESSENTIAL HYPERTENSION: Primary | ICD-10-CM

## 2022-12-12 DIAGNOSIS — Z12.11 COLON CANCER SCREENING: ICD-10-CM

## 2022-12-12 DIAGNOSIS — J45.41 MODERATE PERSISTENT ASTHMA WITH ACUTE EXACERBATION: ICD-10-CM

## 2022-12-12 PROCEDURE — 3017F COLORECTAL CA SCREEN DOC REV: CPT | Performed by: FAMILY MEDICINE

## 2022-12-12 PROCEDURE — 99214 OFFICE O/P EST MOD 30 MIN: CPT | Performed by: FAMILY MEDICINE

## 2022-12-12 PROCEDURE — 1036F TOBACCO NON-USER: CPT | Performed by: FAMILY MEDICINE

## 2022-12-12 PROCEDURE — G8419 CALC BMI OUT NRM PARAM NOF/U: HCPCS | Performed by: FAMILY MEDICINE

## 2022-12-12 PROCEDURE — 3078F DIAST BP <80 MM HG: CPT | Performed by: FAMILY MEDICINE

## 2022-12-12 PROCEDURE — G8427 DOCREV CUR MEDS BY ELIG CLIN: HCPCS | Performed by: FAMILY MEDICINE

## 2022-12-12 PROCEDURE — 3074F SYST BP LT 130 MM HG: CPT | Performed by: FAMILY MEDICINE

## 2022-12-12 PROCEDURE — G8482 FLU IMMUNIZE ORDER/ADMIN: HCPCS | Performed by: FAMILY MEDICINE

## 2022-12-12 RX ORDER — LOSARTAN POTASSIUM 25 MG/1
TABLET ORAL
Qty: 90 TABLET | Refills: 1 | Status: SHIPPED | OUTPATIENT
Start: 2022-12-12

## 2022-12-12 ASSESSMENT — ENCOUNTER SYMPTOMS
CHEST TIGHTNESS: 0
SHORTNESS OF BREATH: 0
WHEEZING: 0
COUGH: 0

## 2022-12-12 NOTE — PROGRESS NOTES
[address] [address]  [address] [address]  Dept: [phone]  Dept Fax: [phone]  Loc: [phone]    Katelyn Loza is a 47 y.o. female who presents today for her medical conditions/complaints as noted below. Katelyn Loza is c/o of   Chief Complaint   Patient presents with    Asthma     6 month       HPI:     HPI Here today for a follow up of her asthma and HTN. She has covid at the beginning of Nov. Seh has fully recovered. She is not coughing much and not short of breath. No issues with chest pain. She was very lethargic since having covid. She has to bed earlier and she is taking a lot longer to get ready in the morning. She has lost her appetite since having covid. She is eating less because she is just not hungry. She is only eating twice a day instead of 3 times a day. She is very happy with her weight. When she does not have covid her asthma has been stable. HTN: stable; she has been doing well. No issues with chest pain or shortness of breath. No leg swelling. No issues with regular headaches. No issues with lightheadedness or dizziness upon standing. Past Medical History:   Diagnosis Date    Anxiety     Asthma     seasonal    HTN (hypertension)           Social History     Tobacco Use    Smoking status: Never     Passive exposure: Yes    Smokeless tobacco: Never   Substance Use Topics    Alcohol use: No     Current Outpatient Medications   Medication Sig Dispense Refill    losartan (COZAAR) 25 MG tablet TAKE 1 TABLET BY MOUTH IN THE MORNING 90 tablet 1    mometasone-formoterol (DULERA) 200-5 MCG/ACT inhaler Inhale 1 puff into the lungs in the morning and 1 puff in the evening.  13 g 3    albuterol sulfate HFA (VENTOLIN HFA) 108 (90 Base) MCG/ACT inhaler Inhale 2 puffs into the lungs every 6 hours as needed for Wheezing 1 each 3    hydrOXYzine pamoate (VISTARIL) 25 MG capsule Take 1 capsule by mouth 3 times daily as needed for Anxiety 90 capsule 1    zoster recombinant adjuvanted vaccine (SHINGRIX) 50 MCG/0.5ML SUSR injection 1 dose now and repeat in 2-6 months 0.5 mL 1    pantoprazole (PROTONIX) 40 MG tablet Take 1 tablet by mouth every morning (before breakfast) 30 tablet 3    polyethylene glycol (GLYCOLAX) powder Take 17 g by mouth daily      aspirin 81 MG EC tablet Take 1 tablet by mouth daily 30 tablet 3     No current facility-administered medications for this visit. No Known Allergies    Subjective:     Review of Systems   Constitutional:  Negative for activity change, appetite change, chills, fatigue and fever. Eyes:  Negative for visual disturbance. Respiratory:  Negative for cough, chest tightness, shortness of breath and wheezing. Cardiovascular:  Negative for chest pain, palpitations and leg swelling. Genitourinary:  Negative for difficulty urinating. Skin:  Negative for rash. Neurological:  Negative for dizziness, syncope, weakness, light-headedness and headaches. Psychiatric/Behavioral:  Negative for dysphoric mood and sleep disturbance. The patient is not nervous/anxious. Objective:      Physical Exam  Vitals and nursing note reviewed. Constitutional:       General: She is not in acute distress. Appearance: She is well-developed. Eyes:      Conjunctiva/sclera: Conjunctivae normal.   Neck:      Thyroid: No thyromegaly. Cardiovascular:      Rate and Rhythm: Normal rate and regular rhythm. Heart sounds: Normal heart sounds. No murmur heard. Pulmonary:      Effort: Pulmonary effort is normal. No respiratory distress. Breath sounds: Normal breath sounds. No wheezing. Musculoskeletal:      Cervical back: Normal range of motion and neck supple. Lymphadenopathy:      Cervical: No cervical adenopathy. Skin:     General: Skin is warm and dry. Findings: No erythema or rash.    Neurological:      Mental Status: She is alert and oriented to person, place, and time. Psychiatric:         Mood and Affect: Mood normal.         Behavior: Behavior normal.         Thought Content: Thought content normal.         Judgment: Judgment normal.     /80   Pulse 83   Temp 98.3 °F (36.8 °C)   Ht 4' 11\" (1.499 m)   Wt 128 lb (58.1 kg)   SpO2 99%   BMI 25.85 kg/m²     Assessment:       Diagnosis Orders   1. Essential hypertension        2. Moderate persistent asthma with acute exacerbation  mometasone-formoterol (DULERA) 200-5 MCG/ACT inhaler      3. Colon cancer screening  Cologuard (Fecal DNA Colorectal Cancer Screening)                Plan:       HTN: stable; her blood pressure is well controlled so I will continue her current dose of medicaitons. Asthma: stable; her breathing is much better since she has recovered from covid. I will continue her current inhalers. HM: I reordered cologuard since it did not make it to her house. Return in about 6 months (around 6/12/2023) for HTN follow up. Orders Placed This Encounter   Procedures    Cologuard (Fecal DNA Colorectal Cancer Screening)     Standing Status:   Future     Standing Expiration Date:   6/12/2024       Orders Placed This Encounter   Medications    losartan (COZAAR) 25 MG tablet     Sig: TAKE 1 TABLET BY MOUTH IN THE MORNING     Dispense:  90 tablet     Refill:  1    mometasone-formoterol (DULERA) 200-5 MCG/ACT inhaler     Sig: Inhale 1 puff into the lungs in the morning and 1 puff in the evening. Dispense:  13 g     Refill:  3         Patientgiven educational materials - see patient instructions. Discussed use, benefit,and side effects of prescribed medications. All patient questions answered. Ptvoiced understanding. Reviewed health maintenance. Instructed to continue currentmedications, diet and exercise. Patient agreed with treatment plan. Follow up asdirected.      Electronically signed by Joslyn Saint, MD on 12/12/2022 at 5:56 PM

## 2023-01-04 ENCOUNTER — E-VISIT (OUTPATIENT)
Dept: PRIMARY CARE CLINIC | Age: 53
End: 2023-01-04
Payer: MEDICAID

## 2023-01-04 DIAGNOSIS — U07.1 COVID-19: Primary | ICD-10-CM

## 2023-01-04 PROCEDURE — 99422 OL DIG E/M SVC 11-20 MIN: CPT | Performed by: FAMILY MEDICINE

## 2023-01-04 RX ORDER — ONDANSETRON 4 MG/1
4 TABLET, ORALLY DISINTEGRATING ORAL 3 TIMES DAILY PRN
Qty: 21 TABLET | Refills: 0 | Status: SHIPPED | OUTPATIENT
Start: 2023-01-04

## 2023-01-04 ASSESSMENT — LIFESTYLE VARIABLES: SMOKING_STATUS: NO, I HAVE NEVER SMOKED

## 2023-01-04 NOTE — LETTER
[address] [place] [address] Urgent Care A department of [facility]  Phone: [phone]  Fax: [phone]    Merton Bloch, MD        January 5, 2023     Patient: Tobin Joaquin   YOB: 1970   Date of Visit: 1/4/2023       To Whom It May Concern: It is my medical opinion that Tobin Joaquin may return to work on 1/11/23. She missed work starting 1/4/23 due to covid. If you have any questions or concerns, please don't hesitate to call.     Sincerely,        Merton Bloch, MD

## 2023-01-04 NOTE — PROGRESS NOTES
Patient's questionairre was reviewed. She has covid. I recommended supportive care and sent in zofran for her. 11-20 minutes were spent on the digital evaluation and management of this patient.

## 2023-07-10 RX ORDER — LOSARTAN POTASSIUM 25 MG/1
TABLET ORAL
Qty: 30 TABLET | Refills: 0 | Status: SHIPPED | OUTPATIENT
Start: 2023-07-10

## 2023-07-10 NOTE — TELEPHONE ENCOUNTER
Mihaela Adams called requesting a refill of the below medication which has been pended for you:     Requested Prescriptions     Pending Prescriptions Disp Refills    losartan (COZAAR) 25 MG tablet [Pharmacy Med Name: LOSARTAN POTASSIUM 25 MG TAB] 30 tablet 0     Sig: take 1 tablet by mouth every morning       Last Appointment Date: 12/22/2022  Next Appointment Date: Visit date not found    No Known Allergies

## 2023-08-04 RX ORDER — LOSARTAN POTASSIUM 25 MG/1
TABLET ORAL
Qty: 30 TABLET | Refills: 0 | OUTPATIENT
Start: 2023-08-04

## 2023-09-25 RX ORDER — LOSARTAN POTASSIUM 25 MG/1
TABLET ORAL
Qty: 30 TABLET | Refills: 0 | OUTPATIENT
Start: 2023-09-25

## 2023-09-28 ENCOUNTER — TELEPHONE (OUTPATIENT)
Dept: FAMILY MEDICINE CLINIC | Age: 53
End: 2023-09-28

## 2023-09-28 ENCOUNTER — E-VISIT (OUTPATIENT)
Dept: FAMILY MEDICINE CLINIC | Age: 53
End: 2023-09-28
Payer: COMMERCIAL

## 2023-09-28 DIAGNOSIS — I10 ESSENTIAL HYPERTENSION: Primary | ICD-10-CM

## 2023-09-28 PROCEDURE — 99422 OL DIG E/M SVC 11-20 MIN: CPT | Performed by: FAMILY MEDICINE

## 2023-09-28 RX ORDER — LOSARTAN POTASSIUM 25 MG/1
TABLET ORAL
Qty: 90 TABLET | Refills: 1 | Status: SHIPPED | OUTPATIENT
Start: 2023-09-28

## 2023-09-28 NOTE — PROGRESS NOTES
Patient's questionnaire was reviewed. Her bp has been okay at home so I sent in a refill of her losartan. 11-20 minutes were spent on the digital evaluation and management of this patient.

## 2023-09-28 NOTE — TELEPHONE ENCOUNTER
Spoke with patient to schedule HTN follow up. Patient could not come in today, but did schedule for next available. Patient does not feel this is an urgent matter, just wanted to see Dr. Ericka Long for physical also. Patient also reports some anxiety with family issues that are ongoing.

## 2023-10-03 DIAGNOSIS — F41.9 ANXIETY: ICD-10-CM

## 2023-10-03 RX ORDER — HYDROXYZINE PAMOATE 25 MG/1
CAPSULE ORAL
Qty: 90 CAPSULE | Refills: 0 | Status: SHIPPED | OUTPATIENT
Start: 2023-10-03

## 2023-10-03 NOTE — TELEPHONE ENCOUNTER
Chelsea Renner called requesting a refill of the below medication which has been pended for you:     Requested Prescriptions     Pending Prescriptions Disp Refills    hydrOXYzine pamoate (VISTARIL) 25 MG capsule [Pharmacy Med Name: HYDROXYZINE ROSSI 25 MG CAP] 90 capsule 0     Sig: take 1 capsule by mouth three times a day if needed for anxiety       Last Appointment Date: 12/12/2022  Next Appointment Date: 11/7/2023    No Known Allergies

## 2023-11-04 SDOH — ECONOMIC STABILITY: FOOD INSECURITY: WITHIN THE PAST 12 MONTHS, THE FOOD YOU BOUGHT JUST DIDN'T LAST AND YOU DIDN'T HAVE MONEY TO GET MORE.: NEVER TRUE

## 2023-11-04 SDOH — ECONOMIC STABILITY: HOUSING INSECURITY
IN THE LAST 12 MONTHS, WAS THERE A TIME WHEN YOU DID NOT HAVE A STEADY PLACE TO SLEEP OR SLEPT IN A SHELTER (INCLUDING NOW)?: NO

## 2023-11-04 SDOH — ECONOMIC STABILITY: TRANSPORTATION INSECURITY
IN THE PAST 12 MONTHS, HAS LACK OF TRANSPORTATION KEPT YOU FROM MEETINGS, WORK, OR FROM GETTING THINGS NEEDED FOR DAILY LIVING?: NO

## 2023-11-04 SDOH — ECONOMIC STABILITY: INCOME INSECURITY: HOW HARD IS IT FOR YOU TO PAY FOR THE VERY BASICS LIKE FOOD, HOUSING, MEDICAL CARE, AND HEATING?: NOT VERY HARD

## 2023-11-04 SDOH — ECONOMIC STABILITY: FOOD INSECURITY: WITHIN THE PAST 12 MONTHS, YOU WORRIED THAT YOUR FOOD WOULD RUN OUT BEFORE YOU GOT MONEY TO BUY MORE.: SOMETIMES TRUE

## 2023-11-04 ASSESSMENT — PATIENT HEALTH QUESTIONNAIRE - PHQ9
SUM OF ALL RESPONSES TO PHQ9 QUESTIONS 1 & 2: 1
SUM OF ALL RESPONSES TO PHQ QUESTIONS 1-9: 1
2. FEELING DOWN, DEPRESSED OR HOPELESS: 1
2. FEELING DOWN, DEPRESSED OR HOPELESS: SEVERAL DAYS
SUM OF ALL RESPONSES TO PHQ QUESTIONS 1-9: 1
1. LITTLE INTEREST OR PLEASURE IN DOING THINGS: 0
SUM OF ALL RESPONSES TO PHQ QUESTIONS 1-9: 1
1. LITTLE INTEREST OR PLEASURE IN DOING THINGS: NOT AT ALL
SUM OF ALL RESPONSES TO PHQ QUESTIONS 1-9: 1
SUM OF ALL RESPONSES TO PHQ9 QUESTIONS 1 & 2: 1

## 2023-11-07 ENCOUNTER — IMMUNIZATION (OUTPATIENT)
Dept: LAB | Age: 53
End: 2023-11-07
Payer: COMMERCIAL

## 2023-11-07 ENCOUNTER — OFFICE VISIT (OUTPATIENT)
Dept: FAMILY MEDICINE CLINIC | Age: 53
End: 2023-11-07
Payer: COMMERCIAL

## 2023-11-07 VITALS
SYSTOLIC BLOOD PRESSURE: 142 MMHG | HEART RATE: 85 BPM | HEIGHT: 59 IN | WEIGHT: 131.3 LBS | DIASTOLIC BLOOD PRESSURE: 80 MMHG | OXYGEN SATURATION: 96 % | BODY MASS INDEX: 26.47 KG/M2

## 2023-11-07 DIAGNOSIS — I10 ESSENTIAL HYPERTENSION: ICD-10-CM

## 2023-11-07 DIAGNOSIS — Z11.59 ENCOUNTER FOR HEPATITIS C SCREENING TEST FOR LOW RISK PATIENT: ICD-10-CM

## 2023-11-07 DIAGNOSIS — Z23 NEED FOR INFLUENZA VACCINATION: ICD-10-CM

## 2023-11-07 DIAGNOSIS — F41.9 ANXIETY: Primary | ICD-10-CM

## 2023-11-07 DIAGNOSIS — Z13.1 SCREENING FOR DIABETES MELLITUS: ICD-10-CM

## 2023-11-07 DIAGNOSIS — Z23 NEED FOR COVID-19 VACCINE: ICD-10-CM

## 2023-11-07 DIAGNOSIS — Z13.220 SCREENING CHOLESTEROL LEVEL: ICD-10-CM

## 2023-11-07 PROCEDURE — PBSHW COVID-19, PFIZER, (AGE 12Y+), IM, PF, 30MCG/0.3ML: Performed by: FAMILY MEDICINE

## 2023-11-07 PROCEDURE — 99214 OFFICE O/P EST MOD 30 MIN: CPT | Performed by: FAMILY MEDICINE

## 2023-11-07 PROCEDURE — 3077F SYST BP >= 140 MM HG: CPT | Performed by: FAMILY MEDICINE

## 2023-11-07 PROCEDURE — 91320 SARSCV2 VAC 30MCG TRS-SUC IM: CPT | Performed by: FAMILY MEDICINE

## 2023-11-07 PROCEDURE — PBSHW INFLUENZA, FLUARIX, (AGE 6 MO+),  IM, PF, 0.5 ML: Performed by: FAMILY MEDICINE

## 2023-11-07 PROCEDURE — 90686 IIV4 VACC NO PRSV 0.5 ML IM: CPT | Performed by: FAMILY MEDICINE

## 2023-11-07 PROCEDURE — 3079F DIAST BP 80-89 MM HG: CPT | Performed by: FAMILY MEDICINE

## 2023-11-07 RX ORDER — FLUOXETINE 10 MG/1
10 CAPSULE ORAL DAILY
Qty: 30 CAPSULE | Refills: 1 | Status: SHIPPED | OUTPATIENT
Start: 2023-11-07

## 2023-11-07 ASSESSMENT — ENCOUNTER SYMPTOMS
COUGH: 0
WHEEZING: 0
NAUSEA: 0
ABDOMINAL PAIN: 0
DIARRHEA: 0
CHEST TIGHTNESS: 0
COLOR CHANGE: 0
SHORTNESS OF BREATH: 0

## 2023-11-07 NOTE — PROGRESS NOTES
if needed for anxiety 90 capsule 0    losartan (COZAAR) 25 MG tablet take 1 tablet by mouth every morning 90 tablet 1    albuterol sulfate HFA (VENTOLIN HFA) 108 (90 Base) MCG/ACT inhaler Inhale 2 puffs into the lungs every 6 hours as needed for Wheezing 1 each 3    pantoprazole (PROTONIX) 40 MG tablet Take 1 tablet by mouth every morning (before breakfast) 30 tablet 3    polyethylene glycol (GLYCOLAX) powder Take 17 g by mouth daily      aspirin 81 MG EC tablet Take 1 tablet by mouth daily 30 tablet 3    ondansetron (ZOFRAN-ODT) 4 MG disintegrating tablet Take 1 tablet by mouth 3 times daily as needed for Nausea or Vomiting 21 tablet 0    mometasone-formoterol (DULERA) 200-5 MCG/ACT inhaler Inhale 1 puff into the lungs in the morning and 1 puff in the evening. 13 g 3    zoster recombinant adjuvanted vaccine (SHINGRIX) 50 MCG/0.5ML SUSR injection 1 dose now and repeat in 2-6 months 0.5 mL 1     No current facility-administered medications for this visit. No Known Allergies    Subjective:     Review of Systems   Constitutional:  Negative for activity change, appetite change, chills, fatigue and fever. Eyes:  Negative for visual disturbance. Respiratory:  Negative for cough, chest tightness, shortness of breath and wheezing. Cardiovascular:  Negative for chest pain, palpitations and leg swelling. Gastrointestinal:  Negative for abdominal pain, diarrhea and nausea. Genitourinary:  Negative for difficulty urinating. Skin:  Negative for color change and rash. Neurological:  Negative for dizziness, syncope, weakness, light-headedness and headaches (improving). Psychiatric/Behavioral:  Positive for dysphoric mood. Negative for sleep disturbance (improving). The patient is nervous/anxious. Objective:      Physical Exam  Vitals and nursing note reviewed. Constitutional:       General: She is not in acute distress. Appearance: She is well-developed.    Eyes:      Conjunctiva/sclera:

## 2023-12-07 ENCOUNTER — TELEPHONE (OUTPATIENT)
Dept: FAMILY MEDICINE CLINIC | Age: 53
End: 2023-12-07

## 2023-12-07 NOTE — TELEPHONE ENCOUNTER
Attempted to reach patient regarding missed appointment on 12/7/23. Unable to contact at this time. Left message to reschedule.

## 2024-01-17 NOTE — TELEPHONE ENCOUNTER
Pricilla called requesting a refill of the below medication which has been pended for you:   Left message for patient to return call to schedule follow up for Anxiety and HTN     Requested Prescriptions     Pending Prescriptions Disp Refills    FLUoxetine (PROZAC) 10 MG capsule [Pharmacy Med Name: FLUOXETINE HCL 10 MG CAPSULE] 30 capsule 0     Sig: take 1 capsule by mouth once daily       Last Appointment Date: 11/7/2023  Next Appointment Date: Visit date not found    No Known Allergies

## 2024-01-22 RX ORDER — FLUOXETINE 10 MG/1
10 CAPSULE ORAL DAILY
Qty: 30 CAPSULE | Refills: 0 | OUTPATIENT
Start: 2024-01-22

## 2024-01-22 NOTE — TELEPHONE ENCOUNTER
Pricilla called requesting a refill of the below medication which has been pended for you:     Requested Prescriptions     Refused Prescriptions Disp Refills    FLUoxetine (PROZAC) 10 MG capsule [Pharmacy Med Name: FLUOXETINE HCL 10 MG CAPSULE] 30 capsule 0     Sig: take 1 capsule by mouth once daily     Refused By: CARLITO KITCHEN     Reason for Refusal: Patient needs an appointment       Last Appointment Date: 11/7/2023  Next Appointment Date: Visit date not found    No Known Allergies

## 2024-04-30 DIAGNOSIS — F41.9 ANXIETY: ICD-10-CM

## 2024-04-30 DIAGNOSIS — Z23 NEED FOR SHINGLES VACCINE: ICD-10-CM

## 2024-04-30 DIAGNOSIS — J45.41 MODERATE PERSISTENT ASTHMA WITH ACUTE EXACERBATION: ICD-10-CM

## 2024-05-01 RX ORDER — ALBUTEROL SULFATE 90 UG/1
2 AEROSOL, METERED RESPIRATORY (INHALATION) EVERY 6 HOURS PRN
Qty: 1 EACH | Refills: 3 | Status: SHIPPED | OUTPATIENT
Start: 2024-05-01

## 2024-05-01 RX ORDER — HYDROXYZINE PAMOATE 25 MG/1
25 CAPSULE ORAL 4 TIMES DAILY PRN
Qty: 90 CAPSULE | Refills: 1 | Status: SHIPPED | OUTPATIENT
Start: 2024-05-01

## 2024-05-01 RX ORDER — FLUOXETINE 10 MG/1
10 CAPSULE ORAL DAILY
Qty: 30 CAPSULE | Refills: 1 | Status: SHIPPED | OUTPATIENT
Start: 2024-05-01

## 2024-05-01 RX ORDER — LOSARTAN POTASSIUM 25 MG/1
TABLET ORAL
Qty: 90 TABLET | Refills: 1 | Status: SHIPPED | OUTPATIENT
Start: 2024-05-01

## 2024-05-09 ENCOUNTER — HOSPITAL ENCOUNTER (OUTPATIENT)
Age: 54
Discharge: HOME | End: 2024-05-09
Payer: COMMERCIAL

## 2024-05-09 DIAGNOSIS — Z13.1 SCREENING FOR DIABETES MELLITUS: ICD-10-CM

## 2024-05-09 DIAGNOSIS — Z13.220 SCREENING CHOLESTEROL LEVEL: ICD-10-CM

## 2024-05-09 DIAGNOSIS — Z11.59 ENCOUNTER FOR HEPATITIS C SCREENING TEST FOR LOW RISK PATIENT: ICD-10-CM

## 2024-05-09 LAB
ALBUMIN SERPL-MCNC: 4.3 G/DL (ref 3.5–5.2)
ALBUMIN/GLOB SERPL: 1.4 {RATIO} (ref 1–2.5)
ALP SERPL-CCNC: 106 U/L (ref 35–104)
ALT SERPL-CCNC: 11 U/L (ref 5–33)
ANION GAP SERPL CALCULATED.3IONS-SCNC: 8 MMOL/L (ref 9–17)
AST SERPL-CCNC: 20 U/L
BILIRUB SERPL-MCNC: 0.4 MG/DL (ref 0.3–1.2)
BUN SERPL-MCNC: 11 MG/DL (ref 6–20)
BUN/CREAT SERPL: 14 (ref 9–20)
CALCIUM SERPL-MCNC: 9.3 MG/DL (ref 8.6–10.4)
CHLORIDE SERPL-SCNC: 106 MMOL/L (ref 98–107)
CHOLEST SERPL-MCNC: 164 MG/DL (ref 0–199)
CHOLESTEROL/HDL RATIO: 4
CO2 SERPL-SCNC: 27 MMOL/L (ref 20–31)
CREAT SERPL-MCNC: 0.8 MG/DL (ref 0.5–0.9)
EST. AVERAGE GLUCOSE BLD GHB EST-MCNC: 111 MG/DL
GFR, ESTIMATED: 88 ML/MIN/1.73M2
GLUCOSE SERPL-MCNC: 97 MG/DL (ref 70–99)
HBA1C MFR BLD: 5.5 % (ref 4–6)
HCV AB SERPL QL IA: NONREACTIVE
HDLC SERPL-MCNC: 46 MG/DL
LDLC SERPL CALC-MCNC: 102 MG/DL (ref 0–100)
POTASSIUM SERPL-SCNC: 4.3 MMOL/L (ref 3.7–5.3)
PROT SERPL-MCNC: 7.3 G/DL (ref 6.4–8.3)
SODIUM SERPL-SCNC: 141 MMOL/L (ref 135–144)
TRIGL SERPL-MCNC: 80 MG/DL
VLDLC SERPL CALC-MCNC: 16 MG/DL

## 2024-05-09 PROCEDURE — 83036 HEMOGLOBIN GLYCOSYLATED A1C: CPT

## 2024-05-09 PROCEDURE — 86803 HEPATITIS C AB TEST: CPT

## 2024-05-09 PROCEDURE — 36415 COLL VENOUS BLD VENIPUNCTURE: CPT

## 2024-05-09 PROCEDURE — 80061 LIPID PANEL: CPT

## 2024-05-09 PROCEDURE — 80053 COMPREHEN METABOLIC PANEL: CPT

## 2024-05-15 ENCOUNTER — TELEPHONE (OUTPATIENT)
Dept: FAMILY MEDICINE CLINIC | Age: 54
End: 2024-05-15

## 2024-05-15 RX ORDER — FLUTICASONE PROPIONATE AND SALMETEROL 250; 50 UG/1; UG/1
1 POWDER RESPIRATORY (INHALATION) EVERY 12 HOURS
Qty: 60 EACH | Refills: 3 | Status: SHIPPED | OUTPATIENT
Start: 2024-05-15

## 2024-05-15 NOTE — TELEPHONE ENCOUNTER
Dulera denied. Patient must have 60 day trial and failure of Fluticasone/Salmeterol Diskus  (generic for Advair). Patient made aware there would be a change in her inhaler.

## 2024-06-04 ENCOUNTER — PATIENT MESSAGE (OUTPATIENT)
Dept: FAMILY MEDICINE CLINIC | Age: 54
End: 2024-06-04

## 2024-06-04 NOTE — TELEPHONE ENCOUNTER
From: Pricilla Hernandez  To: Dr. Jody Fontenot  Sent: 6/4/2024 4:55 PM EDT  Subject: About the pain in my left lung     Good afternoon, I apologize. However the pain I had on my upper left back was due to a very sore muscle that I did not stretch out properly after my workout the day before pain began. After I attempted to request appointment, my pain and soreness started to vanish slowly because throughout the Day I rested and did some more proper stretching for my very sore left upper back muscles. I meant to message you the following day to cancel request ; however the following day , my 2nd mom was not feeling too well so I took her to the er because she had diabetes.     Again, my sincere apologies, I unintentionally forgot to cancel request. I am doing better now, thanks to GOD. Also , are there any concerns with my blood work that you need to discuss with me or was my blood work good?     Have a blessed, safe , and positive rest of your day.

## 2024-06-10 ENCOUNTER — TELEPHONE (OUTPATIENT)
Dept: FAMILY MEDICINE CLINIC | Age: 54
End: 2024-06-10

## 2024-06-10 NOTE — TELEPHONE ENCOUNTER
Attempted to reach patient regarding missed appointment on 6/4/24. Unable to contact at this time. Unable to leave message. Letter mailed to reschedule.

## 2024-11-04 RX ORDER — LOSARTAN POTASSIUM 25 MG/1
TABLET ORAL
Qty: 30 TABLET | Refills: 0 | Status: SHIPPED | OUTPATIENT
Start: 2024-11-04

## 2024-11-04 NOTE — TELEPHONE ENCOUNTER
Pricilla called requesting a refill of the below medication which has been pended for you:   See [facility] message  Requested Prescriptions     Pending Prescriptions Disp Refills    losartan (COZAAR) 25 MG tablet 30 tablet 0     Sig: take 1 tablet by mouth every morning       Last Appointment Date: 11/7/2023  Next Appointment Date: Visit date not found    No Known Allergies

## 2024-12-02 RX ORDER — LOSARTAN POTASSIUM 25 MG/1
TABLET ORAL
Qty: 30 TABLET | Refills: 0 | Status: SHIPPED | OUTPATIENT
Start: 2024-12-02

## 2024-12-02 NOTE — TELEPHONE ENCOUNTER
Pricilla called requesting a refill of the below medication which has been pended for you:     Requested Prescriptions     Pending Prescriptions Disp Refills    losartan (COZAAR) 25 MG tablet [Pharmacy Med Name: Losartan 25mg Tablet] 30 tablet 0     Sig: TAKE ONE (1) TABLET(S) BY MOUTH ONCE A DAY IN THE MORNING.       Last Appointment Date: 11/7/2023  Next Appointment Date:    No Known Allergies

## 2024-12-30 RX ORDER — LOSARTAN POTASSIUM 25 MG/1
TABLET ORAL
Qty: 30 TABLET | Refills: 0 | Status: SHIPPED | OUTPATIENT
Start: 2024-12-30

## 2024-12-30 NOTE — TELEPHONE ENCOUNTER
Pricilla called requesting a refill of the below medication which has been pended for you:     Requested Prescriptions     Pending Prescriptions Disp Refills    losartan (COZAAR) 25 MG tablet [Pharmacy Med Name: Losartan 25mg Tablet] 30 tablet 0     Sig: TAKE ONE (1) TABLET(S) BY MOUTH ONCE A DAY IN THE MORNING.       Last Appointment Date: 11/7/2023  Next Appointment Date:     No Known Allergies   I will SWITCH the dose or number of times a day I take the medications listed below when I get home from the hospital:  None

## 2025-01-27 RX ORDER — LOSARTAN POTASSIUM 25 MG/1
TABLET ORAL
Qty: 30 TABLET | Refills: 0 | OUTPATIENT
Start: 2025-01-27